# Patient Record
Sex: FEMALE | Race: WHITE | NOT HISPANIC OR LATINO | Employment: OTHER | URBAN - METROPOLITAN AREA
[De-identification: names, ages, dates, MRNs, and addresses within clinical notes are randomized per-mention and may not be internally consistent; named-entity substitution may affect disease eponyms.]

---

## 2017-01-03 ENCOUNTER — ALLSCRIPTS OFFICE VISIT (OUTPATIENT)
Dept: OTHER | Facility: OTHER | Age: 77
End: 2017-01-03

## 2017-01-03 DIAGNOSIS — R09.89 OTHER SPECIFIED SYMPTOMS AND SIGNS INVOLVING THE CIRCULATORY AND RESPIRATORY SYSTEMS: ICD-10-CM

## 2017-01-17 ENCOUNTER — HOSPITAL ENCOUNTER (OUTPATIENT)
Dept: NON INVASIVE DIAGNOSTICS | Facility: CLINIC | Age: 77
Discharge: HOME | End: 2017-01-17
Payer: COMMERCIAL

## 2017-01-17 DIAGNOSIS — R09.89 OTHER SPECIFIED SYMPTOMS AND SIGNS INVOLVING THE CIRCULATORY AND RESPIRATORY SYSTEMS: ICD-10-CM

## 2017-01-17 PROCEDURE — 93880 EXTRACRANIAL BILAT STUDY: CPT

## 2017-01-20 ENCOUNTER — GENERIC CONVERSION - ENCOUNTER (OUTPATIENT)
Dept: OTHER | Facility: OTHER | Age: 77
End: 2017-01-20

## 2017-01-22 ENCOUNTER — GENERIC CONVERSION - ENCOUNTER (OUTPATIENT)
Dept: OTHER | Facility: OTHER | Age: 77
End: 2017-01-22

## 2017-03-02 ENCOUNTER — ALLSCRIPTS OFFICE VISIT (OUTPATIENT)
Dept: OTHER | Facility: OTHER | Age: 77
End: 2017-03-02

## 2017-03-02 DIAGNOSIS — M25.531 PAIN IN RIGHT WRIST: ICD-10-CM

## 2017-04-03 ENCOUNTER — ALLSCRIPTS OFFICE VISIT (OUTPATIENT)
Dept: OTHER | Facility: OTHER | Age: 77
End: 2017-04-03

## 2017-04-03 DIAGNOSIS — Z13.820 ENCOUNTER FOR SCREENING FOR OSTEOPOROSIS: ICD-10-CM

## 2017-04-03 DIAGNOSIS — Z12.31 ENCOUNTER FOR SCREENING MAMMOGRAM FOR MALIGNANT NEOPLASM OF BREAST: ICD-10-CM

## 2017-04-03 DIAGNOSIS — M25.561 PAIN IN RIGHT KNEE: ICD-10-CM

## 2017-04-06 ENCOUNTER — GENERIC CONVERSION - ENCOUNTER (OUTPATIENT)
Dept: OTHER | Facility: OTHER | Age: 77
End: 2017-04-06

## 2017-04-13 ENCOUNTER — GENERIC CONVERSION - ENCOUNTER (OUTPATIENT)
Dept: OTHER | Facility: OTHER | Age: 77
End: 2017-04-13

## 2017-05-30 ENCOUNTER — GENERIC CONVERSION - ENCOUNTER (OUTPATIENT)
Dept: OTHER | Facility: OTHER | Age: 77
End: 2017-05-30

## 2017-06-13 ENCOUNTER — GENERIC CONVERSION - ENCOUNTER (OUTPATIENT)
Dept: OTHER | Facility: OTHER | Age: 77
End: 2017-06-13

## 2017-06-13 LAB
25(OH)D3 SERPL-MCNC: 49 NG/ML (ref 30–100)
A/G RATIO (HISTORICAL): 1.4 (CALC) (ref 1–2.5)
ALBUMIN SERPL BCP-MCNC: 3.8 G/DL (ref 3.6–5.1)
ALP SERPL-CCNC: 108 U/L (ref 33–130)
ALT SERPL W P-5'-P-CCNC: 21 U/L (ref 6–29)
AST SERPL W P-5'-P-CCNC: 17 U/L (ref 10–35)
BILIRUB SERPL-MCNC: 0.4 MG/DL (ref 0.2–1.2)
BUN SERPL-MCNC: 19 MG/DL (ref 7–25)
BUN/CREA RATIO (HISTORICAL): 33 (CALC) (ref 6–22)
CALCIUM (ADJUSTED FOR ALBUMIN) (HISTORICAL): 10.8 MG/DL (CALC) (ref 8.6–10.2)
CALCIUM SERPL-MCNC: 10.4 MG/DL (ref 8.6–10.4)
CHLORIDE SERPL-SCNC: 107 MMOL/L (ref 98–110)
CHOLEST SERPL-MCNC: 205 MG/DL (ref 125–200)
CHOLEST/HDLC SERPL: 3.3 (CALC)
CO2 SERPL-SCNC: 29 MMOL/L (ref 20–31)
CREAT SERPL-MCNC: 0.58 MG/DL (ref 0.6–0.93)
DEPRECATED RDW RBC AUTO: 15.4 % (ref 11–15)
EGFR AFRICAN AMERICAN (HISTORICAL): 105 ML/MIN/1.73M2
EGFR-AMERICAN CALC (HISTORICAL): 91 ML/MIN/1.73M2
GAMMA GLOBULIN (HISTORICAL): 2.8 G/DL (CALC) (ref 1.9–3.7)
GLUCOSE (HISTORICAL): 83 MG/DL (ref 65–99)
HCT VFR BLD AUTO: 42.6 % (ref 35–45)
HDLC SERPL-MCNC: 62 MG/DL
HGB BLD-MCNC: 13.8 G/DL (ref 11.7–15.5)
LDL CHOLESTEROL (HISTORICAL): 101 MG/DL (CALC)
MCH RBC QN AUTO: 29.5 PG (ref 27–33)
MCHC RBC AUTO-ENTMCNC: 32.3 G/DL (ref 32–36)
MCV RBC AUTO: 91.6 FL (ref 80–100)
NON-HDL-CHOL (CHOL-HDL) (HISTORICAL): 143 MG/DL (CALC)
PLATELET # BLD AUTO: 270 THOUSAND/UL (ref 140–400)
PMV BLD AUTO: 8.8 FL (ref 7.5–12.5)
POTASSIUM SERPL-SCNC: 4.2 MMOL/L (ref 3.5–5.3)
RBC # BLD AUTO: 4.66 MILLION/UL (ref 3.8–5.1)
SODIUM SERPL-SCNC: 144 MMOL/L (ref 135–146)
TOTAL PROTEIN (HISTORICAL): 6.6 G/DL (ref 6.1–8.1)
TRIGL SERPL-MCNC: 209 MG/DL
TSH SERPL DL<=0.05 MIU/L-ACNC: 2.87 MIU/L (ref 0.4–4.5)
WBC # BLD AUTO: 5.2 THOUSAND/UL (ref 3.8–10.8)

## 2017-08-17 ENCOUNTER — GENERIC CONVERSION - ENCOUNTER (OUTPATIENT)
Dept: OTHER | Facility: OTHER | Age: 77
End: 2017-08-17

## 2017-08-29 ENCOUNTER — GENERIC CONVERSION - ENCOUNTER (OUTPATIENT)
Dept: OTHER | Facility: OTHER | Age: 77
End: 2017-08-29

## 2017-09-05 ENCOUNTER — GENERIC CONVERSION - ENCOUNTER (OUTPATIENT)
Dept: OTHER | Facility: OTHER | Age: 77
End: 2017-09-05

## 2017-10-02 ENCOUNTER — GENERIC CONVERSION - ENCOUNTER (OUTPATIENT)
Dept: OTHER | Facility: OTHER | Age: 77
End: 2017-10-02

## 2017-10-27 ENCOUNTER — GENERIC CONVERSION - ENCOUNTER (OUTPATIENT)
Dept: OTHER | Facility: OTHER | Age: 77
End: 2017-10-27

## 2017-11-28 ENCOUNTER — ALLSCRIPTS OFFICE VISIT (OUTPATIENT)
Dept: OTHER | Facility: OTHER | Age: 77
End: 2017-11-28

## 2017-11-29 NOTE — PROGRESS NOTES
Assessment    1  Chronic pain of right knee (857 89,626 21) (M25 561,G89 29)    Plan  Chronic pain of right knee    · Kenalog 40 MG/ML Injection Suspension (Triamcinolone Acetonide); INJECT1  ML Intra-articular; To Be Done: 47JWQ1432   · 3 - Fazal Khan MD, [facility]  (Orthopedic Surgery) Co-Management  *  Status: Hold For -Scheduling  Requested for: 44NKF1717  : Pt refused all other referrals  are Referring to a non- Preferred Provider : Patient refused suggestion for    recommended provider  Care Summary provided  : Yes  Flu vaccine need    · Fluzone High-Dose 0 5 ML Intramuscular Suspension Prefilled Syringe    Discussion/Summary    Patient is a 51-year-old female withright knee pain - appears persistent  Unclear as to the exact etiology of her symptoms  Request x-ray imaging from [facility]  Reviewed her previous treatment for this problem  It appears that her symptoms may likely be secondary to moderate to severe osteoarthritis  Her corticosteroid steroid injection in the past has been effective for her  This was repeated today  She may likely need physical therapy as well as MRI imaging  She is requesting a referral to Orthopedics  This was given to her today at her request  Follow up if her symptoms are persisting  Chief Complaint  pt here f/u on right knee pain  pt states that it hurt when walking   Patient is here today for follow up of chronic conditions described in HPI  History of Present Illness  Patient is a 51-year-old female presents today with CC of persistent right knee pain  She has been having pain for the past 6 months  She believes it is related to her previous injury  She has been to see her orthopedic specialist  She has had the injection is  Recommendations were made for her to have a synthetic injection  She did have imaging including x-ray imaging  She would like referral today for Orthopedics        Review of Systems   Constitutional: not feeling poorly-- and-- not feeling tired  Eyes: no eyesight problems-- and-- no purulent discharge from the eyes  ENT: no nosebleeds-- and-- no nasal discharge  Cardiovascular: no chest pain-- and-- no palpitations  Respiratory: no cough-- and-- no shortness of breath during exertion  Gastrointestinal: no nausea-- and-- no diarrhea  Genitourinary: no pelvic pain-- and-- no dysmenorrhea  Musculoskeletal: arthralgias, but-- no myalgias  Active Problems    1  Balance disorder (781 99) (R26 89)   2  Benign essential hypertension (401 1) (I10)   3  Cataract of both eyes (366 9) (H26 9)   4  Chronic lumbar pain (724 2,338 29) (M54 5,G89 29)   5  Chronic pain of right knee (609 43,853 83) (M25 561,G89 29)   6  Degeneration of posterior vitreous body, right (379 21) (H43 811)   7  Degenerative arthritis of lumbar spine (721 3) (M47 816)   8  Depression screen (V79 0) (Z13 89)   9  Edema (782 3) (R60 9)   10  Encounter for screening mammogram for breast cancer (V76 12) (Z12 31)   11  Exudative age-related macular degeneration of left eye (362 52) (H35 3220)   12  Generalized osteoarthritis of multiple sites (715 09) (M15 9)   13  Hypercalcemia (275 42) (E83 52)   14  Hyperlipidemia (272 4) (E78 5)   15  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)  (H35 3112)   16  Macular degeneration (362 50) (H35 30)   17  History of Need for pneumococcal vaccination (V03 82) (Z23)   18  Right carotid bruit (785 9) (R09 89)   19  Right wrist pain (719 43) (M25 531)   20  Screening for osteoporosis (V82 81) (Z13 820)   21  Vitamin D deficiency (268 9) (E55 9)    Past Medical History    1  History of Accelerated essential hypertension (401 0) (I10)   2  Acute bronchitis (466 0) (J20 9)   3  History of Encounter for screening colonoscopy (V76 51) (Z12 11)   4  History of Encounter for screening mammogram for malignant neoplasm of breast (V76 12) (Z12 31)   5  History of fatigue (V13 89) (Z87 898)   6   History of low back pain (V13 59) (Z87 39)   7  History of Left Ankle Joint Pain (719 47)   8  History of Muscle cramping (729 82) (R25 2)   9  History of Need for influenza vaccination (V04 81) (Z23)   10  History of Need for pneumococcal vaccination (V03 82) (Z23)   11  History of Need for pneumococcal vaccination (V03 82) (Z23)   12  History of Screening for colon cancer (V76 51) (Z12 11)   13  History of Screening for colon cancer (V76 51) (Z12 11)   14  History of Screening for diabetes mellitus (DM) (V77 1) (Z13 1)   15  History of Screening for thyroid disorder (V77 0) (Z13 29)   16  History of Shoulder joint pain, unspecified laterality   17  History of Shoulder pain, left (719 41) (M25 512)   18  History of Shoulder pain, right (719 41) (M25 511)    The active problems and past medical history were reviewed and updated today  Surgical History  1  History of Eye Surgery   2  History of Foot Surgery    The surgical history was reviewed and updated today  Family History  Father    1  Family history of Coronary artery disease   2  Family history of arthritis (V17 7) (Z82 61)   3  Family history of diabetes mellitus (V18 0) (Z83 3)   4  Family history of malignant neoplasm (V16 9) (Z80 9)  Grandmother    5  Family history of arthritis (V17 7) (Z82 61)   6  Family history of diabetes mellitus (V18 0) (Z83 3)    The family history was reviewed and updated today  Social History     · Former smoker (F37 34) (F02 971)   · Lives with spouse   ·    · Occasional alcohol use   · Two children  The social history was reviewed and updated today  The social history was reviewed and is unchanged  Current Meds   1  Ezetimibe-Simvastatin 10-20 MG Oral Tablet; take 1 tablet by mouth once daily; Therapy: 16JKQ6742 to (Evaluate:30Bsd1823)  Requested for: 86UXX4801; Last Rx:22Rtq0804 Ordered   2  HydroCHLOROthiazide 12 5 MG Oral Capsule; take 1 capsule by mouth once daily;  Therapy: 32Uqj6039 to ([id number]) Requested for: 18CSL9368; Last CS:70MWJ1228 Ordered   3  Kenalog 40 MG/ML Injection Suspension; Inject 1ml intraarticular; To Be Done: 08IDO2482; Status: HOLD FOR - Administration Ordered   4  Losartan Potassium 50 MG Oral Tablet; take 1 tablet by mouth twice a day; Therapy: 59AJZ7297 to (Evaluate:35Vtc3207)  Requested for: 17Pmr6213; Last Rx:44Ijt1691 Ordered   5  MethylPREDNISolone Acetate 80 MG/ML Injection Suspension; INJECT 1  ML Intra-articular; To Be Done: 15XYT6561; Status: HOLD FOR - Administration Ordered   6  Metoprolol Succinate ER 25 MG Oral Tablet Extended Release 24 Hour; take 1 tablet by mouth once daily; Therapy: 24UEN6041 to (Evaluate:86Naf0012)  Requested for: 22YZL3134; Last Rx:45Wyj5136 Ordered   7  Vitamin D-3 5000 UNIT Oral Tablet; TAKE 2 CAPSULE Daily; Therapy: 14TNQ7875 to (Last Rx:62Bza4317) Ordered    The medication list was reviewed and updated today  Allergies  1  Sulfa Drugs    Vitals  Vital Signs    Recorded: 09TMZ7803 08:15AM   Temperature 95 7 F, Tympanic   Heart Rate 69   Pulse Quality Normal   Respiration Quality Normal   Respiration 18   Systolic 296, LUE, Sitting   Diastolic 94, LUE, Sitting   Height 5 ft 4 in   Weight 201 lb 3 oz   BMI Calculated 34 53   BSA Calculated 1 96   O2 Saturation 95   LMP menopause   Pain Scale 5       Physical Exam   Constitutional  General appearance: No acute distress, well appearing and well nourished  Eyes  Conjunctiva and lids: No swelling, erythema or discharge  Pupils and irises: Equal, round and reactive to light  Ears, Nose, Mouth, and Throat  External inspection of ears and nose: Normal    Pulmonary  Respiratory effort: No increased work of breathing or signs of respiratory distress  Musculoskeletal  Gait and station: Abnormal   Gait evaluation demonstrated antalgia on the right  Right Knee: Appearance: no visible abnormalities  Palpation: no pain upon palpation   Special Test: a negative anterior drawer sign,-- a negative Lachman test,-- negative for increased valgus stress,-- negative for increased varus stress,-- a negative lateral Dwayne meniscal integrity test-- and-- a negative medial Dwayne meniscal integrity test       Health Management  History of Encounter for screening mammogram for malignant neoplasm of breast   Digital Bilateral Screening Mammogram With CAD; every 1 year; Last 15ORZ9031; Next XRX:95QWW8040; Overdue  History of Screening for colon cancer   COLONOSCOPY; every 10 years; Next Due: 98Eyc9620; Overdue  Purveyour Maintenance   Medicare Annual Wellness Visit; every 1 year; Next Due: 22Pjd3075; Overdue    Future Appointments    Date/Time Provider Specialty Site   01/09/2018 09:20 AM Chalmers Heimlich, M D  Cardiology  [facility]       Signatures   Electronically signed by :  Clary Silvestre DO; Nov 28 2017  9:06AM EST                       (Author)

## 2018-01-09 ENCOUNTER — ALLSCRIPTS OFFICE VISIT (OUTPATIENT)
Dept: OTHER | Facility: OTHER | Age: 78
End: 2018-01-09

## 2018-01-09 NOTE — RESULT NOTES
Verified Results  (Q) CBC (H/H, RBC, INDICES, WBC, PLT) 46LRO7559 11:30AM Kaila Rennoviaab     Test Name Result Flag Reference   WHITE BLOOD CELL COUNT 5 2 Thousand/uL  3 8-10 8   RED BLOOD CELL COUNT 4 66 Million/uL  3 80-5 10   HEMOGLOBIN 13 8 g/dL  11 7-15 5   HEMATOCRIT 42 6 %  35 0-45 0   MCV 91 6 fL  80 0-100 0   MCH 29 5 pg  27 0-33 0   MCHC 32 3 g/dL  32 0-36 0   RDW 15 4 % H 11 0-15 0   PLATELET COUNT 395 Thousand/uL  140-400   MPV 8 8 fL  7 5-12 5     (Q) COMPREHENSIVE METABOLIC PNL W/ADJUSTED CALCIUM 12Jun2017 11:30AM Kaila DATAllegro     Test Name Result Flag Reference   GLUCOSE 83 mg/dL  65-99   Fasting reference interval   UREA NITROGEN (BUN) 19 mg/dL  7-25   CREATININE 0 58 mg/dL L 0 60-0 93   For patients >52years of age, the reference limit  for Creatinine is approximately 13% higher for people  identified as -American  eGFR NON-AFR  AMERICAN 91 mL/min/1 73m2  > OR = 60   eGFR AFRICAN AMERICAN 105 mL/min/1 73m2  > OR = 60   BUN/CREATININE RATIO 33 (calc) H 6-22   SODIUM 144 mmol/L  135-146   POTASSIUM 4 2 mmol/L  3 5-5 3   CHLORIDE 107 mmol/L     CARBON DIOXIDE 29 mmol/L  20-31   CALCIUM 10 4 mg/dL  8 6-10 4   CALCIUM (ADJUSTED FOR$ALBUMIN) 10 8 mg/dL (calc) H 8 6-10 2   PROTEIN, TOTAL 6 6 g/dL  6 1-8 1   ALBUMIN 3 8 g/dL  3 6-5 1   GLOBULIN 2 8 g/dL (calc)  1 9-3 7   ALBUMIN/GLOBULIN RATIO 1 4 (calc)  1 0-2 5   BILIRUBIN, TOTAL 0 4 mg/dL  0 2-1 2   ALKALINE PHOSPHATASE 108 U/L     AST 17 U/L  10-35   ALT 21 U/L  6-29     (Q) LIPID PANEL WITH REFLEX TO DIRECT LDL 59YKF8640 11:30AM Kaila DATAllegro     Test Name Result Flag Reference   CHOLESTEROL, TOTAL 205 mg/dL H 125-200   HDL CHOLESTEROL 62 mg/dL  > OR = 46   TRIGLICERIDES 287 mg/dL H <150   LDL-CHOLESTEROL 101 mg/dL (calc)  <130   Desirable range <100 mg/dL for patients with CHD or  diabetes and <70 mg/dL for diabetic patients with  known heart disease     CHOL/HDLC RATIO 3 3 (calc)  < OR = 5 0   NON HDL CHOLESTEROL 143 mg/dL (calc)     Target for non-HDL cholesterol is 30 mg/dL higher than   LDL cholesterol target  (Q) TSH, 3RD GENERATION W/REFLEX TO FT4 76SBQ1213 11:30AM Ewa Bright   REPORT COMMENT:  FASTING:YES     Test Name Result Flag Reference   TSH W/REFLEX TO FT4 2 87 mIU/L  0 40-4 50     *(Q) VITAMIN D, 25-HYDROXY, LC/MS/MS 12Jun2017 11:30AM Ewa Bright     Test Name Result Flag Reference   VITAMIN D, 25-OH, TOTAL 49 ng/mL     Vitamin D Status         25-OH Vitamin D:     Deficiency:                    <20 ng/mL  Insufficiency:             20 - 29 ng/mL  Optimal:                 > or = 30 ng/mL     For 25-OH Vitamin D testing on patients on   D2-supplementation and patients for whom quantitation   of D2 and D3 fractions is required, the QuestAssureD(TM)  25-OH VIT D, (D2,D3), LC/MS/MS is recommended: order   code 06989 (patients >2yrs)  For more information on this test, go to:  http://education  [employer]/faq/QIK537  (This link is being provided for   informational/educational purposes only )

## 2018-01-10 NOTE — PROGRESS NOTES
Assessment   Assessed    1  Benign essential hypertension (401 1) (I10)   2  Edema (782 3) (R60 9)   3  Hyperlipidemia (272 4) (E78 5)    Plan   Benign essential hypertension    · EKG/ECG- POC; Status:Complete;   Done: 50PJY3362 09:12AM   Perform: In Office; Last Updated By:Shelli Yanez; 1/9/2018 9:13:21 AM;Ordered; For:Benign essential hypertension; Ordered By:Mir Magdaleno;  Benign essential hypertension, Edema    · Follow-up visit in 1 year Evaluation and Treatment  Follow-up  Status: Hold For -    Scheduling  Requested for: 25VHP4508   Ordered; For: Benign essential hypertension, Edema; Ordered By: Jacque Morrow Performed:  Due: 72FVP0836    Discussion/Summary   Cardiology Discussion Summary Free Text Note Form [facility]: It is my impression that the patient appears to be fairly well compensated with the diagnosis of benign essential hypertension  Her blood pressure was a bit elevated today  We will continue losartan 50 mg BID with HCTZ 12 5 mg daily  She also will continue metoprolol  She was told to monitor her BP at home  I might consider changing her to valsartan since it is more effective than losartan  We also have some room to move up on her beta blocker  I would avoid amlodipine in light of this worsening her edema  Her edema is not that bad  She has had favorable lipids in June and will remain on simvastatin/ezetimibe  She had a negative stress test in 2015 and I believe her LEVI is non cardiac  Her palpitations do not seem pathologic  She does have a questionable right carotid bruit but have did not have obstructive disease on US last year  I will see her again in 12 months time  Chief Complaint   Chief Complaint Free Text Note Form: patient here for 12 month FU of edema and HTN  Yonathan Petit also has HLP    Chief Complaint Chronic Condition [facility]: Patient is here today for follow up of chronic conditions described in HPI        History of Present Illness   Cardiology HPI Free Text Note Form [facility]: Since the patients last visit she ongoing edema  Her edema comes and goes    She denies chest pain  She does have ongoing LEVI  She denies lightheadedness  She does get some palpitations which she describes as her heart beating in her ears  She feels it may be a little rapid      Review of Systems   Cardiology Female ROS:         Cardiac: has swelling in the -- and-- palpitations present , but-- no chest pain-- and-- no rhythm problems  Skin: No complaints of nonhealing sores or skin rash  Genitourinary: frequent urination at night-- and-- post menopausal, but-- no recurrent urinary tract infections,-- no difficult urination,-- no blood in urine,-- no kidney stones,-- no loss of bladder control,-- no kidney problems,-- no birth control or hormone replacement therapy-- and-- not pregnant      Psychological: no depression-- and-- no anxiety  General: lack of energy/fatigue, but-- no trouble sleeping  Respiratory: shortness of breath, but-- no cough/sputum,-- no wheezing,-- no phlegm-- and-- no hemoptysis  Gastrointestinal: heartburn, but-- no nausea,-- no vomiting,-- no bloody stools,-- no diarrhea,-- no constipation-- and-- no rectal bleeding      Neurological: no weakness,-- no headaches-- and-- no dizziness      Musculoskeletal: arthritis-- and-- swelling/pain, but-- no back pain-- right knee injury  ROS Reviewed:    ROS reviewed  Active Problems   Problems    1  Balance disorder (781 99) (R26 89)   2  Benign essential hypertension (401 1) (I10)   3  Cataract of both eyes (366 9) (H26 9)   4  Chronic lumbar pain (724 2,338 29) (M54 5,G89 29)   5  Chronic pain of right knee (358 24,876 32) (M25 561,G89 29)   6  Degeneration of posterior vitreous body, right (379 21) (H43 811)   7  Degenerative arthritis of lumbar spine (721 3) (M47 816)   8  Depression screen (V79 0) (Z13 89)   9  Edema (782 3) (R60 9)   10  Encounter for screening mammogram for breast cancer (V76 12) (Z12 31)   11   Exudative age-related macular degeneration of left eye (362 52) (H35 3220)   12  Flu vaccine need (V04 81) (Z23)   13  Generalized osteoarthritis of multiple sites (715 09) (M15 9)   14  Hypercalcemia (275 42) (E83 52)   15  Hyperlipidemia (272 4) (E78 5)   16  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)      (H35 3112)   17  Macular degeneration (362 50) (H35 30)   18  History of Need for pneumococcal vaccination (V03 82) (Z23)   19  Right carotid bruit (785 9) (R09 89)   20  Right wrist pain (719 43) (M25 531)   21  Screening for osteoporosis (V82 81) (Z13 820)   22  Vitamin D deficiency (268 9) (E55 9)    Past Medical History   Problems    1  History of Accelerated essential hypertension (401 0) (I10)   2  Acute bronchitis (466 0) (J20 9)   3  History of Encounter for screening colonoscopy (V76 51) (Z12 11)   4  History of Encounter for screening mammogram for malignant neoplasm of breast     (V76 12) (Z12 31)   5  History of fatigue (V13 89) (Z87 898)   6  History of low back pain (V13 59) (Z87 39)   7  History of Left Ankle Joint Pain (719 47)   8  History of Muscle cramping (729 82) (R25 2)   9  History of Need for influenza vaccination (V04 81) (Z23)   10  History of Need for pneumococcal vaccination (V03 82) (Z23)   11  History of Need for pneumococcal vaccination (V03 82) (Z23)   12  History of Screening for colon cancer (V76 51) (Z12 11)   13  History of Screening for colon cancer (V76 51) (Z12 11)   14  History of Screening for diabetes mellitus (DM) (V77 1) (Z13 1)   15  History of Screening for thyroid disorder (V77 0) (Z13 29)   16  History of Shoulder joint pain, unspecified laterality   17  History of Shoulder pain, left (719 41) (M25 512)   18  History of Shoulder pain, right (719 41) (M25 511)  Active Problems And Past Medical History Reviewed: The active problems and past medical history were reviewed and updated today  Surgical History   Problems    1   History of Eye Surgery   2  History of Foot Surgery  Surgical History Reviewed: The surgical history was reviewed and updated today  Family History   Father    1  Family history of Coronary artery disease   2  Family history of arthritis (V17 7) (Z82 61)   3  Family history of diabetes mellitus (V18 0) (Z83 3)   4  Family history of malignant neoplasm (V16 9) (Z80 9)  Grandmother    5  Family history of arthritis (V17 7) (Z82 61)   6  Family history of diabetes mellitus (V18 0) (Z83 3)  Family History Reviewed: The family history was reviewed and updated today  Social History   Problems    · Former smoker (L71 71) (Z92 557)   · Lives with spouse   ·    · Occasional alcohol use   · Two children  Social History Reviewed: The social history was reviewed and updated today  The social history was reviewed and is unchanged  Current Meds    1  Ezetimibe-Simvastatin 10-20 MG Oral Tablet; take 1 tablet by mouth once daily; Therapy: 72QBY4156 to (Evaluate:50Uya0259)  Requested for: 83HZY6013; Last     Rx:08Vbz0358 Ordered   2  HydroCHLOROthiazide 12 5 MG Oral Capsule; take 1 capsule by mouth once daily; Therapy: 18IYC9334 to (64 873 135)  Requested for: 23GSV8633; Last     Rx:60Wfk9327 Ordered   3  Kenalog 40 MG/ML Injection Suspension; Inject 1ml intraarticular; To Be Done:     64AKI3982; Status: HOLD FOR - Administration Ordered   4  Losartan Potassium 50 MG Oral Tablet; take 1 tablet by mouth twice a day; Therapy: 25KNG3350 to (Evaluate:08Pma6033)  Requested for: 08Cvr1632; Last     Rx:91Tsa4550 Ordered   5  MethylPREDNISolone Acetate 80 MG/ML Injection Suspension; INJECT 1  ML     Intra-articular; To Be Done: 02VHD2074; Status: HOLD FOR - Administration Ordered   6  Metoprolol Succinate ER 25 MG Oral Tablet Extended Release 24 Hour; take 1 tablet by     mouth once daily; Therapy: 26ZAT1652 to 616 496 185)  Requested for: 89Jmf7091; Last     Rx:98Ccc6955 Ordered   7  PreserVision AREDS 2+Multi Vit Oral Capsule; TAKE AS DIRECTED; Therapy: 45NKP7930 to Recorded   8  Vitamin D-3 5000 UNIT Oral Tablet; TAKE 2 CAPSULE Daily; Therapy: 88DOP0842 to (Last Rx:16Hgo7969) Ordered  Medication List Reviewed: The medication list was reviewed and updated today  Allergies   Medication    1  Sulfa Drugs    Vitals   Vital Signs    Recorded: 03VMW7539 09:21AM   Heart Rate 73   Respiration 16   Systolic 259, RUE, Sitting   Diastolic 84, RUE, Sitting   BP CUFF SIZE Large   Height 5 ft 4 in   Weight 201 lb    BMI Calculated 34 5   BSA Calculated 1 96     Physical Exam        Constitutional      General appearance: Abnormal  -- obese elderly white female in NAD  Eyes      Conjunctiva and Sclera examination: Conjunctiva pink, sclera anicteric  Ears, Nose, Mouth, and Throat - Oropharynx: Clear, nares are clear, mucous membranes are moist       Neck      Neck and thyroid: Normal, supple, trachea midline, no thyromegaly  Pulmonary      Auscultation of lungs: Clear to auscultation, no rales, no rhonchi, no wheezing, good air movement  Cardiovascular      Auscultation of heart: Normal rate and rhythm, normal S1 and S2, no murmurs  Carotid pulses: Abnormal  -- questionable bruit of right carotid-nl upstroke    no obstructive disease on CA US 2017  Peripheral vascular exam: Normal pulses throughout, no tenderness, erythema or swelling  Pedal pulses: Normal, 2+ bilaterally  Examination of extremities for edema and/or varicosities: Abnormal  -- 1+ edema of the LEs  Abdomen      Abdomen: Non-tender and no distention  Liver and spleen: No hepatomegaly or splenomegaly  Health Management   History of Encounter for screening mammogram for malignant neoplasm of breast   Digital Bilateral Screening Mammogram With CAD; every 1 year; Last 08ZYB5632; Next Due:    25XJK9458;  Overdue  History of Screening for colon cancer   COLONOSCOPY; every 10 years; Next Due: 86Auh9807; Overdue  Health Maintenance   Medicare Annual Wellness Visit; every 1 year; Next Due: 91Vxr6034;  Overdue    Signatures    Electronically signed by : MICHELL Dow ; Jan 9 2018 10:03AM EST                       (Author)

## 2018-01-11 NOTE — RESULT NOTES
Verified Results  VAS CAROTID COMPLETE STUDY 42WLC1159 07:52AM Maria Esther Vilchis Order Number: [id number]    - Patient Instructions: To schedule this appointment, please contact Central Scheduling at [phone]  Test Name Result Flag Reference   VAS CAROTID COMPLETE STUDY (Report)     [facility] REPORT   CLINICAL:   Indications: Bruit [R09 89]  Patient is asymptomatic  Risk Factors   The patient has history of obesity, HTN and hyperlipidemia  Clinical   Right Pressure: 160/84 mm Hg,      FINDINGS:      Right    Impression PSV EDV (cm/s) Direction of Flow Ratio    Dist  ICA         81     21           0 77    Mid  ICA         75     15           0 72    Prox  ICA  1 - 49%   98     23           0 94    Dist CCA         90     15                 Mid CCA         104     20           1 17    Prox CCA         89     19                 Ext Carotid       130     10           1 24    Prox Vert         39     11 Antegrade            Subclavian        349      6                    Left     Impression PSV EDV (cm/s) Ratio    Dist  ICA         95     29  1 01    Mid  ICA         102     29  1 08    Prox  ICA  1 - 49%   74     17  0 79    Dist CCA         94     18        Mid CCA          94     18  1 15    Prox CCA         82     18        Ext Carotid       137      9  1 45    Prox Vert         65     15        Subclavian        164      9                 CONCLUSION:   Impression   RIGHT:   There is <50% stenosis noted in the internal carotid artery  Plaque is   heterogenous  Vertebral artery flow is antegrade  There is no significant subclavian artery   disease  LEFT:   There is <50% stenosis noted in the internal carotid artery  Plaque is   heterogenous  Vertebral artery flow is antegrade  There is no significant subclavian artery   disease        Internal carotid artery stenosis determination by consensus criteria from:   Poonam Sandoval , et al  Carotid Artery Stenosis: Gray-Scale and Doppler US Diagnosis   - Society of [employer]iologists in [address] [place] [address], Radiology 2003;   [phone]        SIGNATURE:   Electronically Signed by: Jimy Coelho MD on 2017-01-17 11:10:43 AM

## 2018-01-12 VITALS
RESPIRATION RATE: 16 BRPM | SYSTOLIC BLOOD PRESSURE: 140 MMHG | WEIGHT: 200.38 LBS | HEART RATE: 63 BPM | DIASTOLIC BLOOD PRESSURE: 58 MMHG | BODY MASS INDEX: 34.21 KG/M2 | HEIGHT: 64 IN

## 2018-01-12 VITALS
DIASTOLIC BLOOD PRESSURE: 94 MMHG | TEMPERATURE: 95.7 F | RESPIRATION RATE: 18 BRPM | HEART RATE: 69 BPM | SYSTOLIC BLOOD PRESSURE: 140 MMHG | BODY MASS INDEX: 34.35 KG/M2 | HEIGHT: 64 IN | WEIGHT: 201.19 LBS | OXYGEN SATURATION: 95 %

## 2018-01-13 NOTE — RESULT NOTES
Verified Results  (1) CBC/PLT/DIFF 11Aug2016 10:18AM Seguricel     Test Name Result Flag Reference   WHITE BLOOD CELL COUNT 6 4 Thousand/uL  3 8-10 8   RED BLOOD CELL COUNT 4 76 Million/uL  3 80-5 10   HEMOGLOBIN 13 5 g/dL  11 7-15 5   HEMATOCRIT 42 7 %  35 0-45 0   MCV 89 6 fL  80 0-100 0   MCH 28 4 pg  27 0-33 0   MCHC 31 7 g/dL L 32 0-36 0   RDW 14 3 %  11 0-15 0   PLATELET COUNT 826 Thousand/uL  140-400   MPV 9 1 fL  7 5-11 5   ABSOLUTE NEUTROPHILS 3309 cells/uL  1876-3739   ABSOLUTE LYMPHOCYTES 2234 cells/uL  850-3900   ABSOLUTE MONOCYTES 634 cells/uL  200-950   ABSOLUTE EOSINOPHILS 186 cells/uL     ABSOLUTE BASOPHILS 38 cells/uL  0-200   NEUTROPHILS 51 7 %     LYMPHOCYTES 34 9 %     MONOCYTES 9 9 %     EOSINOPHILS 2 9 %     BASOPHILS 0 6 %       (1) COMPREHENSIVE METABOLIC PANEL [id number] 20:30PZ Seguricel     Test Name Result Flag Reference   GLUCOSE 98 mg/dL  65-99   Fasting reference interval   UREA NITROGEN (BUN) 20 mg/dL  7-25   CREATININE 0 55 mg/dL L 0 60-0 93   For patients >52years of age, the reference limit  for Creatinine is approximately 13% higher for people  identified as -American  eGFR NON-AFR   AMERICAN 92 mL/min/1 73m2  > OR = 60   eGFR AFRICAN AMERICAN 107 mL/min/1 73m2  > OR = 60   BUN/CREATININE RATIO 36 (calc) H 6-22   SODIUM 141 mmol/L  135-146   POTASSIUM 4 8 mmol/L  3 5-5 3   CHLORIDE 104 mmol/L     CARBON DIOXIDE 29 mmol/L  20-31   CALCIUM 10 5 mg/dL H 8 6-10 4   PROTEIN, TOTAL 6 8 g/dL  6 1-8 1   ALBUMIN 3 8 g/dL  3 6-5 1   GLOBULIN 3 0 g/dL (calc)  1 9-3 7   ALBUMIN/GLOBULIN RATIO 1 3 (calc)  1 0-2 5   BILIRUBIN, TOTAL 0 5 mg/dL  0 2-1 2   ALKALINE PHOSPHATASE 115 U/L     AST 18 U/L  10-35   ALT 18 U/L  6-29     (Q) LIPID PANEL WITH REFLEX TO DIRECT LDL [id number] 10:18AM Seguricel     Test Name Result Flag Reference   CHOLESTEROL, TOTAL 252 mg/dL H 125-200   HDL CHOLESTEROL 59 mg/dL  > OR = 46   TRIGLICERIDES 127 mg/dL H <150 LDL-CHOLESTEROL 143 mg/dL (calc) H <130   Desirable range <100 mg/dL for patients with CHD or  diabetes and <70 mg/dL for diabetic patients with  known heart disease  CHOL/HDLC RATIO 4 3 (calc)  < OR = 5 0   NON HDL CHOLESTEROL 193 mg/dL (calc) H    Target for non-HDL cholesterol is 30 mg/dL higher than   LDL cholesterol target  *(Q) VITAMIN D, 25-HYDROXY, LC/MS/MS 11Aug2016 10:18AM Izaiah Andre     Test Name Result Flag Reference   VITAMIN D, 25-OH, TOTAL 48 ng/mL     Vitamin D Status         25-OH Vitamin D:     Deficiency:                    <20 ng/mL  Insufficiency:             20 - 29 ng/mL  Optimal:                 > or = 30 ng/mL     For 25-OH Vitamin D testing on patients on   D2-supplementation and patients for whom quantitation   of D2 and D3 fractions is required, the QuestAssureD(TM)  25-OH VIT D, (D2,D3), LC/MS/MS is recommended: order   code 89763 (patients >2yrs)  For more information on this test, go to:  [email or web address]  (This link is being provided for   informational/educational purposes only )     (Q) TSH, 3RD GENERATION W/REFLEX TO FT4 11Aug2016 10:18AM Izaiah Andre   REPORT COMMENT:  FASTING:YES     Test Name Result Flag Reference   TSH W/REFLEX TO FT4 3 88 mIU/L  0 40-4 50

## 2018-01-14 VITALS
HEART RATE: 61 BPM | BODY MASS INDEX: 34.7 KG/M2 | TEMPERATURE: 97 F | RESPIRATION RATE: 16 BRPM | WEIGHT: 203.25 LBS | OXYGEN SATURATION: 98 % | SYSTOLIC BLOOD PRESSURE: 138 MMHG | HEIGHT: 64 IN | DIASTOLIC BLOOD PRESSURE: 86 MMHG

## 2018-01-14 VITALS
WEIGHT: 203.56 LBS | TEMPERATURE: 96.5 F | HEIGHT: 64 IN | BODY MASS INDEX: 34.75 KG/M2 | RESPIRATION RATE: 16 BRPM | SYSTOLIC BLOOD PRESSURE: 170 MMHG | DIASTOLIC BLOOD PRESSURE: 100 MMHG | HEART RATE: 68 BPM | OXYGEN SATURATION: 98 %

## 2018-01-23 VITALS
BODY MASS INDEX: 34.31 KG/M2 | RESPIRATION RATE: 16 BRPM | SYSTOLIC BLOOD PRESSURE: 150 MMHG | HEART RATE: 73 BPM | DIASTOLIC BLOOD PRESSURE: 84 MMHG | HEIGHT: 64 IN | WEIGHT: 201 LBS

## 2018-02-22 ENCOUNTER — OFFICE VISIT (OUTPATIENT)
Dept: FAMILY MEDICINE CLINIC | Facility: CLINIC | Age: 78
End: 2018-02-22
Payer: COMMERCIAL

## 2018-02-22 VITALS
SYSTOLIC BLOOD PRESSURE: 138 MMHG | OXYGEN SATURATION: 97 % | HEART RATE: 70 BPM | BODY MASS INDEX: 35.12 KG/M2 | HEIGHT: 65 IN | TEMPERATURE: 98.3 F | WEIGHT: 210.8 LBS | RESPIRATION RATE: 16 BRPM | DIASTOLIC BLOOD PRESSURE: 70 MMHG

## 2018-02-22 DIAGNOSIS — I10 BENIGN ESSENTIAL HYPERTENSION: Primary | ICD-10-CM

## 2018-02-22 DIAGNOSIS — R60.9 EDEMA, UNSPECIFIED TYPE: ICD-10-CM

## 2018-02-22 DIAGNOSIS — E78.2 MIXED HYPERLIPIDEMIA: ICD-10-CM

## 2018-02-22 PROBLEM — G89.29 CHRONIC PAIN OF RIGHT KNEE: Status: ACTIVE | Noted: 2017-04-03

## 2018-02-22 PROBLEM — M25.561 CHRONIC PAIN OF RIGHT KNEE: Status: ACTIVE | Noted: 2017-04-03

## 2018-02-22 PROCEDURE — 3078F DIAST BP <80 MM HG: CPT | Performed by: FAMILY MEDICINE

## 2018-02-22 PROCEDURE — 99214 OFFICE O/P EST MOD 30 MIN: CPT | Performed by: FAMILY MEDICINE

## 2018-02-22 PROCEDURE — 3075F SYST BP GE 130 - 139MM HG: CPT | Performed by: FAMILY MEDICINE

## 2018-02-22 RX ORDER — EZETIMIBE AND SIMVASTATIN 10; 20 MG/1; MG/1
1 TABLET ORAL DAILY
COMMUNITY
Start: 2013-07-19 | End: 2018-03-01 | Stop reason: SDUPTHER

## 2018-02-22 RX ORDER — LOSARTAN POTASSIUM 50 MG/1
1 TABLET ORAL 2 TIMES DAILY
COMMUNITY
Start: 2015-06-01 | End: 2018-10-26 | Stop reason: SDUPTHER

## 2018-02-22 RX ORDER — FUROSEMIDE 20 MG/1
20 TABLET ORAL DAILY
Qty: 30 TABLET | Refills: 1 | Status: SHIPPED | OUTPATIENT
Start: 2018-02-22 | End: 2018-04-30 | Stop reason: SDUPTHER

## 2018-02-22 RX ORDER — METOPROLOL SUCCINATE 25 MG/1
1 TABLET, EXTENDED RELEASE ORAL DAILY
COMMUNITY
Start: 2015-03-13 | End: 2018-04-30 | Stop reason: SDUPTHER

## 2018-02-22 RX ORDER — MAG HYDROX/ALUMINUM HYD/SIMETH 400-400-40
1 SUSPENSION, ORAL (FINAL DOSE FORM) ORAL DAILY
COMMUNITY
Start: 2014-09-25

## 2018-02-22 RX ORDER — HYDROCHLOROTHIAZIDE 12.5 MG/1
1 CAPSULE, GELATIN COATED ORAL DAILY
COMMUNITY
Start: 2015-04-27 | End: 2018-04-03 | Stop reason: SDUPTHER

## 2018-02-22 NOTE — ASSESSMENT & PLAN NOTE
Still w/ LE edema despite hctz 12 5 bid  Will add furosemide 20 mg qd  Check electrolytes/renal function in 2-3 weeks (rx given for FBW )  Will call  Poss need to increase furosemide dose

## 2018-02-22 NOTE — PROGRESS NOTES
Assessment/Plan:    Benign essential hypertension  Reasonably controlled on losartan 50, metoprolol 25, hctz 12 5 bid  Edema  Still w/ LE edema despite hctz 12 5 bid  Will add furosemide 20 mg qd  Check electrolytes/renal function in 2-3 weeks (rx given for FBW )  Will call  Poss need to increase furosemide dose  Hyperlipidemia  Was switched to generic vytorin 10/20  Will check labs in near future  Will call  Diagnoses and all orders for this visit:    Benign essential hypertension  -     TSH, 3rd generation with T4 reflex; Future    Edema, unspecified type  -     CBC and differential; Future  -     furosemide (LASIX) 20 mg tablet; Take 1 tablet (20 mg total) by mouth daily    Mixed hyperlipidemia  -     Comprehensive metabolic panel; Future  -     Lipid Panel with Direct LDL reflex; Future    Other orders  -     hydrochlorothiazide (MICROZIDE) 12 5 mg capsule; Take 1 capsule by mouth daily  -     metoprolol succinate (TOPROL-XL) 25 mg 24 hr tablet; Take 1 tablet by mouth daily  -     losartan (COZAAR) 50 mg tablet; Take 1 tablet by mouth 2 (two) times a day  -     Cholecalciferol (VITAMIN D3) 5000 units CAPS; Take 2 capsules by mouth daily  -     Multiple Vitamins-Minerals (PRESERVISION AREDS 2+MULTI VIT PO); Take by mouth  -     ezetimibe-simvastatin (VYTORIN) 10-20 mg per tablet; Take 1 tablet by mouth daily      rx given for FBW in 3 weeks  Will call w/ results  Subjective:      Patient ID: Shelba Canavan is a 78 y o  female  HPI    The following portions of the patient's history were reviewed and updated as appropriate: allergies, current medications, past family history, past medical history, past social history, past surgical history and problem list     Review of Systems   Respiratory: Negative  Cardiovascular: Negative  Gastrointestinal: Negative  Genitourinary: Negative            Objective:      /70 (BP Location: Right arm, Patient Position: Sitting, Cuff Size: Standard) Pulse 70   Temp 98 3 °F (36 8 °C) (Tympanic)   Resp 16   Ht 5' 4 96" (1 65 m)   Wt 95 6 kg (210 lb 12 8 oz)   SpO2 97%   BMI 35 12 kg/m²          Physical Exam   Cardiovascular: Normal rate, regular rhythm, normal heart sounds and intact distal pulses  Carotids: no bruits  Ext: no edema   Pulmonary/Chest: Effort normal  No respiratory distress  She has no wheezes  She has no rales  Psychiatric: She has a normal mood and affect   Her behavior is normal  Thought content normal

## 2018-03-01 DIAGNOSIS — E78.5 HYPERLIPIDEMIA, UNSPECIFIED HYPERLIPIDEMIA TYPE: Primary | ICD-10-CM

## 2018-03-02 RX ORDER — EZETIMIBE AND SIMVASTATIN 10; 20 MG/1; MG/1
1 TABLET ORAL DAILY
Qty: 90 TABLET | Refills: 0 | OUTPATIENT
Start: 2018-03-02 | End: 2018-07-11 | Stop reason: SDUPTHER

## 2018-03-12 NOTE — TELEPHONE ENCOUNTER
I called the [facility] to verify if pt picked up rx  I spoke to the pharmacist pt picked up her rx on 03/11/18

## 2018-04-03 DIAGNOSIS — I10 BENIGN ESSENTIAL HYPERTENSION: Primary | ICD-10-CM

## 2018-04-04 RX ORDER — HYDROCHLOROTHIAZIDE 12.5 MG/1
CAPSULE, GELATIN COATED ORAL
Qty: 90 CAPSULE | Refills: 1 | Status: SHIPPED | OUTPATIENT
Start: 2018-04-04 | End: 2018-07-31 | Stop reason: SDUPTHER

## 2018-04-30 DIAGNOSIS — I10 BENIGN ESSENTIAL HYPERTENSION: Primary | ICD-10-CM

## 2018-04-30 DIAGNOSIS — R60.9 EDEMA, UNSPECIFIED TYPE: ICD-10-CM

## 2018-04-30 LAB
ALP SERPL-CCNC: 115 U/L (ref 46–116)
ALT SERPL W P-5'-P-CCNC: 27 U/L (ref 12–78)
AST SERPL W P-5'-P-CCNC: 18 U/L (ref 5–45)
BILIRUB SERPL-MCNC: 0.4 MG/DL
BUN SERPL-MCNC: 19 MG/DL (ref 5–25)
CHOLEST SERPL-MCNC: 176 MG/DL (ref 50–200)
CREAT SERPL-MCNC: 0.65 MG/DL (ref 0.6–1.3)
GLUCOSE SERPL-MCNC: 94 MG/DL
HCT VFR BLD AUTO: 40.9 % (ref 34.8–46.1)
HDLC SERPL-MCNC: 58 MG/DL (ref 40–60)
HGB BLD-MCNC: 13.4 G/DL (ref 11.5–15.4)
LDLC SERPL DIRECT ASSAY-MCNC: 71 MG/DL
LDLC/HDLC SERPL: 3.03 {RATIO}
NEUTROPHILS # BLD AUTO: 3.8 THOUSANDS/ΜL (ref 1.85–7.62)
PLATELET # BLD AUTO: 279 THOUSANDS/UL (ref 149–390)
POTASSIUM SERPL-SCNC: 4.5 MMOL/L (ref 3.5–5.3)
SODIUM SERPL-SCNC: 144 MMOL/L (ref 136–145)
TRIGL SERPL-MCNC: 237 MG/DL (ref ?–150)
TSH SERPL DL<=0.05 MIU/L-ACNC: 5.4 UIU/ML (ref 0.34–4.82)
WBC # BLD AUTO: 7.8 10*3/ML (ref 3.3–10)

## 2018-04-30 RX ORDER — FUROSEMIDE 20 MG/1
TABLET ORAL
Qty: 30 TABLET | Refills: 1 | Status: SHIPPED | OUTPATIENT
Start: 2018-04-30 | End: 2018-07-31 | Stop reason: SDUPTHER

## 2018-05-01 RX ORDER — METOPROLOL SUCCINATE 25 MG/1
TABLET, EXTENDED RELEASE ORAL
Qty: 90 TABLET | Refills: 0 | Status: SHIPPED | OUTPATIENT
Start: 2018-05-01 | End: 2018-09-10 | Stop reason: SDUPTHER

## 2018-05-03 LAB
CALCIUM SERPL-MCNC: 10.3 MG/DL (ref 8.3–10.1)
CO2 SERPL-SCNC: 34 MMOL/L (ref 21–32)
T4 FREE SERPL-MCNC: 0.9 NG/DL (ref 0.76–1.46)

## 2018-05-15 ENCOUNTER — TELEPHONE (OUTPATIENT)
Dept: FAMILY MEDICINE CLINIC | Facility: CLINIC | Age: 78
End: 2018-05-15

## 2018-05-16 NOTE — TELEPHONE ENCOUNTER
Pt notified, she states she still has swelling in her legs and will increase furosemide  She will call back to schedule in one week if need be

## 2018-05-16 NOTE — TELEPHONE ENCOUNTER
Call patient with lab results  Her thyroid was borderline low normal   Remainder of labs look pretty good  How is  This swelling in her legs? If not improving, then recommend increasing furosemide to 2 tablets daily   Recheck appointment in 1 week if not improved

## 2018-07-11 DIAGNOSIS — E78.5 HYPERLIPIDEMIA, UNSPECIFIED HYPERLIPIDEMIA TYPE: ICD-10-CM

## 2018-07-12 RX ORDER — EZETIMIBE AND SIMVASTATIN 10; 20 MG/1; MG/1
TABLET ORAL
Qty: 90 TABLET | Refills: 1 | Status: SHIPPED | OUTPATIENT
Start: 2018-07-12 | End: 2019-03-15 | Stop reason: SDUPTHER

## 2018-07-31 ENCOUNTER — OFFICE VISIT (OUTPATIENT)
Dept: FAMILY MEDICINE CLINIC | Facility: CLINIC | Age: 78
End: 2018-07-31
Payer: COMMERCIAL

## 2018-07-31 VITALS
BODY MASS INDEX: 34.88 KG/M2 | RESPIRATION RATE: 17 BRPM | WEIGHT: 204.3 LBS | SYSTOLIC BLOOD PRESSURE: 154 MMHG | TEMPERATURE: 96.4 F | HEART RATE: 74 BPM | DIASTOLIC BLOOD PRESSURE: 84 MMHG | OXYGEN SATURATION: 91 % | HEIGHT: 64 IN

## 2018-07-31 DIAGNOSIS — M17.11 ARTHRITIS OF RIGHT KNEE: ICD-10-CM

## 2018-07-31 DIAGNOSIS — E78.2 MIXED HYPERLIPIDEMIA: ICD-10-CM

## 2018-07-31 DIAGNOSIS — R60.9 EDEMA, UNSPECIFIED TYPE: ICD-10-CM

## 2018-07-31 DIAGNOSIS — I10 BENIGN ESSENTIAL HYPERTENSION: ICD-10-CM

## 2018-07-31 DIAGNOSIS — Z01.818 PREOP EXAMINATION: Primary | ICD-10-CM

## 2018-07-31 PROBLEM — G89.29 CHRONIC PAIN OF RIGHT KNEE: Status: RESOLVED | Noted: 2017-04-03 | Resolved: 2018-07-31

## 2018-07-31 PROBLEM — M25.561 CHRONIC PAIN OF RIGHT KNEE: Status: RESOLVED | Noted: 2017-04-03 | Resolved: 2018-07-31

## 2018-07-31 PROCEDURE — 3725F SCREEN DEPRESSION PERFORMED: CPT | Performed by: FAMILY MEDICINE

## 2018-07-31 PROCEDURE — 1160F RVW MEDS BY RX/DR IN RCRD: CPT | Performed by: FAMILY MEDICINE

## 2018-07-31 PROCEDURE — 4040F PNEUMOC VAC/ADMIN/RCVD: CPT | Performed by: FAMILY MEDICINE

## 2018-07-31 PROCEDURE — 99214 OFFICE O/P EST MOD 30 MIN: CPT | Performed by: FAMILY MEDICINE

## 2018-07-31 PROCEDURE — 1036F TOBACCO NON-USER: CPT | Performed by: FAMILY MEDICINE

## 2018-07-31 RX ORDER — FUROSEMIDE 20 MG/1
20 TABLET ORAL DAILY
Qty: 90 TABLET | Refills: 1 | Status: SHIPPED | OUTPATIENT
Start: 2018-07-31 | End: 2019-07-13 | Stop reason: SDUPTHER

## 2018-07-31 RX ORDER — HYDROCHLOROTHIAZIDE 12.5 MG/1
12.5 CAPSULE, GELATIN COATED ORAL DAILY
Qty: 90 CAPSULE | Refills: 1 | Status: SHIPPED | OUTPATIENT
Start: 2018-07-31 | End: 2019-04-17 | Stop reason: SDUPTHER

## 2018-07-31 NOTE — PROGRESS NOTES
Assessment/Plan:    Preop examination  This is preoperative clearance for upcoming right total knee arthroplasty on August 30th by Dr Jessica Grullon  Otherwise patient is feeling well  She is doing well on all currently  Prescribed medications without side effects       Her examination today is unremarkable  She is scheduled for PA T labs and EKG  Tomorrow  I asked her to forward me results  She is already set received cardiac clearance from Dr Christianson Corporal  pending PA T results   patient is medically  Cleared for upcoming surgery       Diagnoses and all orders for this visit:    Preop examination    Arthritis of right knee    Benign essential hypertension  -     hydrochlorothiazide (MICROZIDE) 12 5 mg capsule; Take 1 capsule (12 5 mg total) by mouth daily    Edema, unspecified type  -     furosemide (LASIX) 20 mg tablet; Take 1 tablet (20 mg total) by mouth daily    Mixed hyperlipidemia          Subjective:      Patient ID: Rosa Marin is a 70 y o  female  This is preoperative clearance for upcoming right total knee arthroplasty on August 30th by Dr Jessica Grullon  Otherwise patient is feeling well  She is doing well on all currently  Prescribed medications without side effects           The following portions of the patient's history were reviewed and updated as appropriate: allergies, current medications, past family history, past medical history, past social history, past surgical history and problem list     Review of Systems   Respiratory: Negative  Cardiovascular: Negative  Gastrointestinal: Negative  Genitourinary: Negative  Musculoskeletal: Positive for arthralgias  Objective:      /84   Pulse 74   Temp (!) 96 4 °F (35 8 °C) (Tympanic)   Resp 17   Ht 5' 4" (1 626 m)   Wt 92 7 kg (204 lb 4 8 oz)   SpO2 91%   BMI 35 07 kg/m²          Physical Exam   Cardiovascular: Normal rate, regular rhythm, normal heart sounds and intact distal pulses      Carotids: no bruits  Ext: no edema   Pulmonary/Chest: Effort normal  No respiratory distress  She has no wheezes  She has no rales  Psychiatric: She has a normal mood and affect   Her behavior is normal  Thought content normal

## 2018-07-31 NOTE — ASSESSMENT & PLAN NOTE
This is preoperative clearance for upcoming right total knee arthroplasty on August 30th by Dr Eden Cooks  Otherwise patient is feeling well  She is doing well on all currently  Prescribed medications without side effects       Her examination today is unremarkable  She is scheduled for PA T labs and EKG  Tomorrow  I asked her to forward me results  She is already set received cardiac clearance from Dr Butler Shown       pending PA T results   patient is medically  Cleared for upcoming surgery

## 2018-08-25 ENCOUNTER — TELEPHONE (OUTPATIENT)
Dept: FAMILY MEDICINE CLINIC | Facility: CLINIC | Age: 78
End: 2018-08-25

## 2018-08-25 NOTE — TELEPHONE ENCOUNTER
Pt called the office she is scheduled for surgery on 8/30/2018 and is requesting her pre op clearance  Forms please be completed and or faxed she received a message from her surgeon that if they do not receive it they will post pone her surgery  I did read that pt was cleared for surgery and pt was informed that the form is waiting for Dr Aggarwal to address once he is in the office which will be on Monday 8/27/2018  Dr Aggarwal please fax asap  Pt would appreciate a phone call once pre op forms are faxed we may leave message on the home phone

## 2018-08-26 ENCOUNTER — TELEPHONE (OUTPATIENT)
Dept: FAMILY MEDICINE CLINIC | Facility: CLINIC | Age: 78
End: 2018-08-26

## 2018-08-26 NOTE — TELEPHONE ENCOUNTER
Calling [facility] to request pt's blwk that she had done on 8/01/2018  Spoke to Donald Harris she will be sending us pt's blwk results to our back line fax

## 2018-08-26 NOTE — TELEPHONE ENCOUNTER
Pt's pre admisssion testing blwk from [facility] in on your desk in your folder attached to pt's office note and form  I called and left a detailed message for pt on her cell phone info,ing her I spoke with  Jose Alejandro Cadena and he is aware of her prop forms and we received pt's blood work so Jose Alejandro Cadena can review and clear her for surgery once we get an answer we will fax her paper work and call pt  I just wanted pt informed Jose Alejandro Cadena is aware and we did receive her blwk

## 2018-08-26 NOTE — TELEPHONE ENCOUNTER
Also calling [facility] requesting the actual ekg for Dr Aggarwal to clear pt  Left a message with the answering service to request a copy of pt's ekg be faxed asap to our office

## 2018-09-10 DIAGNOSIS — I10 BENIGN ESSENTIAL HYPERTENSION: ICD-10-CM

## 2018-09-10 RX ORDER — METOPROLOL SUCCINATE 25 MG/1
25 TABLET, EXTENDED RELEASE ORAL DAILY
Qty: 90 TABLET | Refills: 1 | Status: SHIPPED | OUTPATIENT
Start: 2018-09-10 | End: 2019-08-12 | Stop reason: SDUPTHER

## 2018-10-26 DIAGNOSIS — I10 ESSENTIAL HYPERTENSION: Primary | ICD-10-CM

## 2018-10-28 RX ORDER — LOSARTAN POTASSIUM 50 MG/1
TABLET ORAL
Qty: 180 TABLET | Refills: 3 | Status: SHIPPED | OUTPATIENT
Start: 2018-10-28 | End: 2020-03-04 | Stop reason: SDUPTHER

## 2019-02-21 ENCOUNTER — OFFICE VISIT (OUTPATIENT)
Dept: FAMILY MEDICINE CLINIC | Facility: CLINIC | Age: 79
End: 2019-02-21
Payer: COMMERCIAL

## 2019-02-21 VITALS
RESPIRATION RATE: 17 BRPM | HEART RATE: 73 BPM | BODY MASS INDEX: 33.34 KG/M2 | SYSTOLIC BLOOD PRESSURE: 146 MMHG | TEMPERATURE: 98.6 F | OXYGEN SATURATION: 93 % | HEIGHT: 64 IN | DIASTOLIC BLOOD PRESSURE: 86 MMHG | WEIGHT: 195.3 LBS

## 2019-02-21 DIAGNOSIS — E78.2 MIXED HYPERLIPIDEMIA: ICD-10-CM

## 2019-02-21 DIAGNOSIS — R60.9 EDEMA, UNSPECIFIED TYPE: ICD-10-CM

## 2019-02-21 DIAGNOSIS — M17.11 ARTHRITIS OF RIGHT KNEE: ICD-10-CM

## 2019-02-21 DIAGNOSIS — J01.00 ACUTE NON-RECURRENT MAXILLARY SINUSITIS: Primary | ICD-10-CM

## 2019-02-21 DIAGNOSIS — I10 BENIGN ESSENTIAL HYPERTENSION: ICD-10-CM

## 2019-02-21 PROCEDURE — 1036F TOBACCO NON-USER: CPT | Performed by: FAMILY MEDICINE

## 2019-02-21 PROCEDURE — 1160F RVW MEDS BY RX/DR IN RCRD: CPT | Performed by: FAMILY MEDICINE

## 2019-02-21 PROCEDURE — 99214 OFFICE O/P EST MOD 30 MIN: CPT | Performed by: FAMILY MEDICINE

## 2019-02-21 RX ORDER — AMOXICILLIN 875 MG/1
875 TABLET, COATED ORAL 2 TIMES DAILY
Qty: 20 TABLET | Refills: 0 | Status: SHIPPED | OUTPATIENT
Start: 2019-02-21 | End: 2019-03-03

## 2019-02-21 NOTE — ASSESSMENT & PLAN NOTE
Status post right total knee arthroplasty by Dr Israel Montelongo 8/2018    Overall she is doing well

## 2019-02-21 NOTE — ASSESSMENT & PLAN NOTE
Much improved since starting furosemide 20 mg daily p r n  Jose Luis Jose   Patient takes a few tablets per week

## 2019-02-21 NOTE — PROGRESS NOTES
[facility]      NAME: Ubaldo Wagner  AGE: 70 y o  SEX: female  : 1940   MRN: [id number]    DATE: 2019  TIME: 10:55 AM    Assessment and Plan     Problem List Items Addressed This Visit     Benign essential hypertension     Suboptimal   Patient has not been taking blood pressure medications while she is sick  I stressed compliance with losartan 50, metoprolol 25, hydrochlorothiazide 12 5  Will recheck at her upcoming Medicare wellness visit         Relevant Orders    CBC and differential    TSH, 3rd generation with Free T4 reflex    Edema     Much improved since starting furosemide 20 mg daily p r n  Gerhardt Sep Patient takes a few tablets per week         Relevant Orders    Comprehensive metabolic panel    Hyperlipidemia     Doing well on Vytorin 10/20  Will check fasting blood work in near future followed by appointment         Relevant Orders    Comprehensive metabolic panel    Lipid Panel with Direct LDL reflex    Arthritis of right knee     Status post right total knee arthroplasty by Dr Marin Whittington 2018  Overall she is doing well           Other Visit Diagnoses     Acute non-recurrent maxillary sinusitis    -  Primary    Rx given for amoxicillin 875 b i d  Times 10 days  Drink plenty of fluids  Call further problems    Relevant Medications    amoxicillin (AMOXIL) 875 mg tablet              Return to office in:  Fasting blood work in near future at [facility] [employer] followed by [address] [place] [address] Complaint   Patient presents with    Cough     x1wk    Sore Throat     x1wk    Chest Congestion     x1wk       History of Present Illness     Sore throat and cough x 5 days  ?fevers  Due to symptoms, she has not taken her BP meds lately  Pt is a non smoker  Pt did not have a flu shot this season  Doing well on Vytorin for cholesterol          The following portions of the patient's history were reviewed and updated as appropriate: allergies, current medications, past family history, past medical history, past social history, past surgical history and problem list     Review of Systems   Review of Systems   Constitutional: Negative for chills and fever  HENT: Positive for congestion and postnasal drip  Respiratory: Positive for cough  Negative for shortness of breath  Cardiovascular: Negative  Active Problem List     Patient Active Problem List   Diagnosis    Benign essential hypertension    Edema    Hyperlipidemia    Preop examination    Arthritis of right knee       Objective   /86   Pulse 73   Temp 98 6 °F (37 °C) (Tympanic)   Resp 17   Ht 5' 4" (1 626 m)   Wt 88 6 kg (195 lb 4 8 oz)   SpO2 93%   BMI 33 52 kg/m²     Physical Exam   Constitutional: She appears well-developed and well-nourished  HENT:   Turbinates inflamed   Neck: Normal range of motion  Neck supple  Pulmonary/Chest: Effort normal and breath sounds normal        Pertinent Laboratory/Diagnostic Studies:  None    Current Medications     Current Outpatient Medications:     Cholecalciferol (VITAMIN D3) 5000 units CAPS, Take 2 capsules by mouth daily, Disp: , Rfl:     ezetimibe-simvastatin (VYTORIN) 10-20 mg per tablet, TAKE 1 TABLET DAILY  , Disp: 90 tablet, Rfl: 1    furosemide (LASIX) 20 mg tablet, Take 1 tablet (20 mg total) by mouth daily, Disp: 90 tablet, Rfl: 1    hydrochlorothiazide (MICROZIDE) 12 5 mg capsule, Take 1 capsule (12 5 mg total) by mouth daily, Disp: 90 capsule, Rfl: 1    losartan (COZAAR) 50 mg tablet, take 1 tablet by mouth twice a day, Disp: 180 tablet, Rfl: 3    metoprolol succinate (TOPROL-XL) 25 mg 24 hr tablet, Take 1 tablet (25 mg total) by mouth daily, Disp: 90 tablet, Rfl: 1    Multiple Vitamins-Minerals (PRESERVISION AREDS 2+MULTI VIT PO), Take by mouth, Disp: , Rfl:     Multiple Vitamins-Minerals (PRESERVISION AREDS 2+MULTI VIT PO), Take 1 capsule by mouth daily, Disp: , Rfl:     amoxicillin (AMOXIL) 875 mg tablet, Take 1 tablet (875 mg total) by mouth 2 (two) times a day for 10 days, Disp: 20 tablet, Rfl: 0    Health Maintenance     Health Maintenance   Topic Date Due    Medicare Annual Wellness Visit (AWV)  1940    SLP PLAN OF CARE  1940    CRC Screening: Colonoscopy  1940    BMI: Followup Plan  06/28/1960    DTaP,Tdap,and Td Vaccines (1 - Tdap) 06/28/1963    Fall Risk  06/28/2007    Urinary Incontinence Screening  06/28/2007    INFLUENZA VACCINE  07/01/2018    Depression Screening PHQ  07/31/2019    BMI: Adult  07/31/2019    Pneumococcal PPSV23/PCV13 65+ Years / Low and Medium Risk  Completed    HEPATITIS B VACCINES  Aged Out     Immunization History   Administered Date(s) Administered    INFLUENZA 11/28/2017    Influenza Split High Dose Preservative Free IM 11/28/2017    Pneumococcal Conjugate 13-Valent 08/04/2015    Pneumococcal Polysaccharide PPV23 07/19/2013       Dandre Simmons DO  [facility]3] [facility]

## 2019-02-21 NOTE — ASSESSMENT & PLAN NOTE
Suboptimal   Patient has not been taking blood pressure medications while she is sick  I stressed compliance with losartan 50, metoprolol 25, hydrochlorothiazide 12 5    Will recheck at her upcoming Medicare wellness visit

## 2019-03-15 DIAGNOSIS — E78.5 HYPERLIPIDEMIA, UNSPECIFIED HYPERLIPIDEMIA TYPE: ICD-10-CM

## 2019-03-16 RX ORDER — EZETIMIBE AND SIMVASTATIN 10; 20 MG/1; MG/1
1 TABLET ORAL DAILY
Qty: 90 TABLET | Refills: 1 | Status: SHIPPED | OUTPATIENT
Start: 2019-03-16 | End: 2019-12-11 | Stop reason: SDUPTHER

## 2019-04-17 DIAGNOSIS — I10 BENIGN ESSENTIAL HYPERTENSION: ICD-10-CM

## 2019-04-17 RX ORDER — HYDROCHLOROTHIAZIDE 12.5 MG/1
CAPSULE, GELATIN COATED ORAL
Qty: 90 CAPSULE | Refills: 1 | Status: SHIPPED | OUTPATIENT
Start: 2019-04-17 | End: 2019-12-11 | Stop reason: SDUPTHER

## 2019-07-13 DIAGNOSIS — R60.9 EDEMA, UNSPECIFIED TYPE: ICD-10-CM

## 2019-07-14 RX ORDER — FUROSEMIDE 20 MG/1
20 TABLET ORAL DAILY
Qty: 90 TABLET | Refills: 1 | Status: SHIPPED | OUTPATIENT
Start: 2019-07-14 | End: 2020-10-01 | Stop reason: SDUPTHER

## 2019-08-12 DIAGNOSIS — I10 BENIGN ESSENTIAL HYPERTENSION: ICD-10-CM

## 2019-08-13 RX ORDER — METOPROLOL SUCCINATE 25 MG/1
25 TABLET, EXTENDED RELEASE ORAL DAILY
Qty: 90 TABLET | Refills: 1 | Status: SHIPPED | OUTPATIENT
Start: 2019-08-13 | End: 2020-03-02

## 2019-12-11 DIAGNOSIS — E78.5 HYPERLIPIDEMIA, UNSPECIFIED HYPERLIPIDEMIA TYPE: ICD-10-CM

## 2019-12-11 DIAGNOSIS — I10 BENIGN ESSENTIAL HYPERTENSION: ICD-10-CM

## 2019-12-11 RX ORDER — EZETIMIBE AND SIMVASTATIN 10; 20 MG/1; MG/1
1 TABLET ORAL DAILY
Qty: 90 TABLET | Refills: 1 | Status: SHIPPED | OUTPATIENT
Start: 2019-12-11 | End: 2020-10-01 | Stop reason: SDUPTHER

## 2019-12-11 RX ORDER — HYDROCHLOROTHIAZIDE 12.5 MG/1
12.5 CAPSULE, GELATIN COATED ORAL DAILY
Qty: 90 CAPSULE | Refills: 1 | Status: SHIPPED | OUTPATIENT
Start: 2019-12-11 | End: 2020-08-13

## 2020-03-02 DIAGNOSIS — I10 BENIGN ESSENTIAL HYPERTENSION: ICD-10-CM

## 2020-03-02 RX ORDER — METOPROLOL SUCCINATE 25 MG/1
TABLET, EXTENDED RELEASE ORAL
Qty: 30 TABLET | Refills: 0 | Status: SHIPPED | OUTPATIENT
Start: 2020-03-02 | End: 2020-10-01 | Stop reason: SDUPTHER

## 2020-03-02 NOTE — TELEPHONE ENCOUNTER
Patient informed she needs an appt  She was leaving to go to a  and could not make it now  She will call back to schedule

## 2020-03-02 NOTE — TELEPHONE ENCOUNTER
Call patient, she is overdue for checkup and AWV    Will refill her metoprolol prescription for 30 days, but cannot issue any further refills without appointment

## 2020-03-04 DIAGNOSIS — I10 ESSENTIAL HYPERTENSION: ICD-10-CM

## 2020-03-04 RX ORDER — LOSARTAN POTASSIUM 50 MG/1
50 TABLET ORAL 2 TIMES DAILY
Qty: 60 TABLET | Refills: 0 | Status: SHIPPED | OUTPATIENT
Start: 2020-03-04 | End: 2020-04-13 | Stop reason: SDUPTHER

## 2020-03-04 NOTE — TELEPHONE ENCOUNTER
Call patient    I will refill her losartan prescription for 1 more month, but she needs an appointment before any further refills will be authorized

## 2020-04-06 ENCOUNTER — TELEPHONE (OUTPATIENT)
Dept: FAMILY MEDICINE CLINIC | Facility: CLINIC | Age: 80
End: 2020-04-06

## 2020-04-13 DIAGNOSIS — I10 ESSENTIAL HYPERTENSION: ICD-10-CM

## 2020-04-13 RX ORDER — LOSARTAN POTASSIUM 50 MG/1
50 TABLET ORAL 2 TIMES DAILY
Qty: 180 TABLET | Refills: 0 | Status: SHIPPED | OUTPATIENT
Start: 2020-04-13 | End: 2020-07-15

## 2020-06-09 ENCOUNTER — TELEPHONE (OUTPATIENT)
Dept: FAMILY MEDICINE CLINIC | Facility: CLINIC | Age: 80
End: 2020-06-09

## 2020-06-14 DIAGNOSIS — I10 BENIGN ESSENTIAL HYPERTENSION: ICD-10-CM

## 2020-06-14 RX ORDER — METOPROLOL SUCCINATE 25 MG/1
TABLET, EXTENDED RELEASE ORAL
Qty: 30 TABLET | Refills: 0 | OUTPATIENT
Start: 2020-06-14

## 2020-07-15 DIAGNOSIS — I10 ESSENTIAL HYPERTENSION: ICD-10-CM

## 2020-07-15 RX ORDER — LOSARTAN POTASSIUM 50 MG/1
TABLET ORAL
Qty: 180 TABLET | Refills: 0 | Status: SHIPPED | OUTPATIENT
Start: 2020-07-15 | End: 2020-10-01 | Stop reason: SDUPTHER

## 2020-08-13 DIAGNOSIS — I10 BENIGN ESSENTIAL HYPERTENSION: ICD-10-CM

## 2020-08-13 RX ORDER — HYDROCHLOROTHIAZIDE 12.5 MG/1
CAPSULE, GELATIN COATED ORAL
Qty: 90 CAPSULE | Refills: 1 | Status: SHIPPED | OUTPATIENT
Start: 2020-08-13 | End: 2020-10-01 | Stop reason: SDUPTHER

## 2020-09-03 DIAGNOSIS — R60.9 EDEMA, UNSPECIFIED TYPE: ICD-10-CM

## 2020-09-03 DIAGNOSIS — R73.09 ABNORMAL BLOOD SUGAR: ICD-10-CM

## 2020-09-03 DIAGNOSIS — I10 BENIGN ESSENTIAL HYPERTENSION: Primary | ICD-10-CM

## 2020-09-03 DIAGNOSIS — E78.2 MIXED HYPERLIPIDEMIA: ICD-10-CM

## 2020-09-22 DIAGNOSIS — E78.5 HYPERLIPIDEMIA, UNSPECIFIED HYPERLIPIDEMIA TYPE: ICD-10-CM

## 2020-09-22 RX ORDER — EZETIMIBE AND SIMVASTATIN 10; 20 MG/1; MG/1
1 TABLET ORAL DAILY
Qty: 90 TABLET | Refills: 1 | OUTPATIENT
Start: 2020-09-22

## 2020-10-01 ENCOUNTER — OFFICE VISIT (OUTPATIENT)
Dept: FAMILY MEDICINE CLINIC | Facility: CLINIC | Age: 80
End: 2020-10-01
Payer: COMMERCIAL

## 2020-10-01 VITALS
HEART RATE: 75 BPM | WEIGHT: 209 LBS | OXYGEN SATURATION: 96 % | HEIGHT: 64 IN | BODY MASS INDEX: 35.68 KG/M2 | TEMPERATURE: 97 F | SYSTOLIC BLOOD PRESSURE: 120 MMHG | DIASTOLIC BLOOD PRESSURE: 80 MMHG | RESPIRATION RATE: 16 BRPM

## 2020-10-01 DIAGNOSIS — I10 BENIGN ESSENTIAL HYPERTENSION: ICD-10-CM

## 2020-10-01 DIAGNOSIS — Z78.0 POSTMENOPAUSAL: ICD-10-CM

## 2020-10-01 DIAGNOSIS — M19.90 ARTHRITIS: ICD-10-CM

## 2020-10-01 DIAGNOSIS — M17.11 ARTHRITIS OF RIGHT KNEE: ICD-10-CM

## 2020-10-01 DIAGNOSIS — E78.5 HYPERLIPIDEMIA, UNSPECIFIED HYPERLIPIDEMIA TYPE: ICD-10-CM

## 2020-10-01 DIAGNOSIS — Z00.00 ENCOUNTER FOR ANNUAL WELLNESS EXAM IN MEDICARE PATIENT: Primary | ICD-10-CM

## 2020-10-01 DIAGNOSIS — E78.2 MIXED HYPERLIPIDEMIA: ICD-10-CM

## 2020-10-01 DIAGNOSIS — R60.9 EDEMA, UNSPECIFIED TYPE: ICD-10-CM

## 2020-10-01 DIAGNOSIS — Z12.31 ENCOUNTER FOR SCREENING MAMMOGRAM FOR BREAST CANCER: ICD-10-CM

## 2020-10-01 DIAGNOSIS — F41.9 ANXIETY: ICD-10-CM

## 2020-10-01 PROCEDURE — 1160F RVW MEDS BY RX/DR IN RCRD: CPT | Performed by: FAMILY MEDICINE

## 2020-10-01 PROCEDURE — 99214 OFFICE O/P EST MOD 30 MIN: CPT | Performed by: FAMILY MEDICINE

## 2020-10-01 PROCEDURE — 1036F TOBACCO NON-USER: CPT | Performed by: FAMILY MEDICINE

## 2020-10-01 PROCEDURE — 1125F AMNT PAIN NOTED PAIN PRSNT: CPT | Performed by: FAMILY MEDICINE

## 2020-10-01 PROCEDURE — 1170F FXNL STATUS ASSESSED: CPT | Performed by: FAMILY MEDICINE

## 2020-10-01 PROCEDURE — G0439 PPPS, SUBSEQ VISIT: HCPCS | Performed by: FAMILY MEDICINE

## 2020-10-01 PROCEDURE — 1101F PT FALLS ASSESS-DOCD LE1/YR: CPT | Performed by: FAMILY MEDICINE

## 2020-10-01 PROCEDURE — 3288F FALL RISK ASSESSMENT DOCD: CPT | Performed by: FAMILY MEDICINE

## 2020-10-01 PROCEDURE — 3725F SCREEN DEPRESSION PERFORMED: CPT | Performed by: FAMILY MEDICINE

## 2020-10-01 PROCEDURE — 3079F DIAST BP 80-89 MM HG: CPT | Performed by: FAMILY MEDICINE

## 2020-10-01 PROCEDURE — 3074F SYST BP LT 130 MM HG: CPT | Performed by: FAMILY MEDICINE

## 2020-10-01 RX ORDER — EZETIMIBE AND SIMVASTATIN 10; 20 MG/1; MG/1
1 TABLET ORAL DAILY
Qty: 90 TABLET | Refills: 1 | Status: SHIPPED | OUTPATIENT
Start: 2020-10-01 | End: 2021-03-24 | Stop reason: SDUPTHER

## 2020-10-01 RX ORDER — FUROSEMIDE 20 MG/1
20 TABLET ORAL DAILY
Qty: 90 TABLET | Refills: 1 | Status: SHIPPED | OUTPATIENT
Start: 2020-10-01 | End: 2021-03-24 | Stop reason: SDUPTHER

## 2020-10-01 RX ORDER — METOPROLOL SUCCINATE 25 MG/1
25 TABLET, EXTENDED RELEASE ORAL DAILY
Qty: 90 TABLET | Refills: 1 | Status: SHIPPED | OUTPATIENT
Start: 2020-10-01 | End: 2021-03-24 | Stop reason: SDUPTHER

## 2020-10-01 RX ORDER — HYDROCHLOROTHIAZIDE 12.5 MG/1
12.5 CAPSULE, GELATIN COATED ORAL DAILY
Qty: 90 CAPSULE | Refills: 1 | Status: SHIPPED | OUTPATIENT
Start: 2020-10-01 | End: 2021-09-23 | Stop reason: SDUPTHER

## 2020-10-01 RX ORDER — LOSARTAN POTASSIUM 50 MG/1
50 TABLET ORAL 2 TIMES DAILY
Qty: 180 TABLET | Refills: 1 | Status: SHIPPED | OUTPATIENT
Start: 2020-10-01 | End: 2021-06-17 | Stop reason: SDUPTHER

## 2020-11-06 ENCOUNTER — VBI (OUTPATIENT)
Dept: ADMINISTRATIVE | Facility: OTHER | Age: 80
End: 2020-11-06

## 2020-11-12 ENCOUNTER — TELEPHONE (OUTPATIENT)
Dept: FAMILY MEDICINE CLINIC | Facility: CLINIC | Age: 80
End: 2020-11-12

## 2021-03-24 DIAGNOSIS — R60.9 EDEMA, UNSPECIFIED TYPE: ICD-10-CM

## 2021-03-24 DIAGNOSIS — E78.5 HYPERLIPIDEMIA, UNSPECIFIED HYPERLIPIDEMIA TYPE: ICD-10-CM

## 2021-03-24 DIAGNOSIS — I10 BENIGN ESSENTIAL HYPERTENSION: ICD-10-CM

## 2021-03-24 RX ORDER — FUROSEMIDE 20 MG/1
20 TABLET ORAL DAILY
Qty: 90 TABLET | Refills: 1 | Status: SHIPPED | OUTPATIENT
Start: 2021-03-24 | End: 2021-09-23 | Stop reason: SDUPTHER

## 2021-03-24 RX ORDER — METOPROLOL SUCCINATE 25 MG/1
25 TABLET, EXTENDED RELEASE ORAL DAILY
Qty: 90 TABLET | Refills: 1 | Status: SHIPPED | OUTPATIENT
Start: 2021-03-24 | End: 2021-09-23 | Stop reason: SDUPTHER

## 2021-03-24 RX ORDER — EZETIMIBE AND SIMVASTATIN 10; 20 MG/1; MG/1
1 TABLET ORAL DAILY
Qty: 90 TABLET | Refills: 1 | Status: SHIPPED | OUTPATIENT
Start: 2021-03-24 | End: 2021-09-23 | Stop reason: SDUPTHER

## 2021-06-17 DIAGNOSIS — I10 BENIGN ESSENTIAL HYPERTENSION: ICD-10-CM

## 2021-06-17 RX ORDER — LOSARTAN POTASSIUM 50 MG/1
50 TABLET ORAL 2 TIMES DAILY
Qty: 180 TABLET | Refills: 0 | Status: SHIPPED | OUTPATIENT
Start: 2021-06-17 | End: 2021-09-23 | Stop reason: SDUPTHER

## 2021-06-17 NOTE — TELEPHONE ENCOUNTER
Call patient  I sent in prescription for her losartan for 90 days, but she is due for appointment    Please schedule

## 2021-09-23 DIAGNOSIS — R60.9 EDEMA, UNSPECIFIED TYPE: ICD-10-CM

## 2021-09-23 DIAGNOSIS — I10 BENIGN ESSENTIAL HYPERTENSION: ICD-10-CM

## 2021-09-23 DIAGNOSIS — E78.5 HYPERLIPIDEMIA, UNSPECIFIED HYPERLIPIDEMIA TYPE: ICD-10-CM

## 2021-09-23 RX ORDER — LOSARTAN POTASSIUM 50 MG/1
50 TABLET ORAL 2 TIMES DAILY
Qty: 180 TABLET | Refills: 0 | Status: SHIPPED | OUTPATIENT
Start: 2021-09-23

## 2021-09-23 RX ORDER — EZETIMIBE AND SIMVASTATIN 10; 20 MG/1; MG/1
1 TABLET ORAL DAILY
Qty: 90 TABLET | Refills: 1 | Status: SHIPPED | OUTPATIENT
Start: 2021-09-23

## 2021-09-23 RX ORDER — HYDROCHLOROTHIAZIDE 12.5 MG/1
12.5 CAPSULE, GELATIN COATED ORAL DAILY
Qty: 90 CAPSULE | Refills: 1 | Status: SHIPPED | OUTPATIENT
Start: 2021-09-23

## 2021-09-23 RX ORDER — METOPROLOL SUCCINATE 25 MG/1
25 TABLET, EXTENDED RELEASE ORAL DAILY
Qty: 90 TABLET | Refills: 1 | Status: SHIPPED | OUTPATIENT
Start: 2021-09-23

## 2021-09-23 RX ORDER — FUROSEMIDE 20 MG/1
20 TABLET ORAL DAILY
Qty: 90 TABLET | Refills: 1 | Status: SHIPPED | OUTPATIENT
Start: 2021-09-23

## 2022-01-20 ENCOUNTER — RA CDI HCC (OUTPATIENT)
Dept: OTHER | Facility: HOSPITAL | Age: 82
End: 2022-01-20

## 2022-01-20 NOTE — PROGRESS NOTES
[facility]  coding opportunities       Chart reviewed, no opportunity found: CHART REVIEWED, NO OPPORTUNITY FOUND                        Patients insurance company: [employer] (Medicare Advantage and Commercial)

## 2022-01-24 PROBLEM — Z01.818 PREOP EXAMINATION: Status: RESOLVED | Noted: 2018-07-31 | Resolved: 2022-01-24

## 2022-01-27 ENCOUNTER — OFFICE VISIT (OUTPATIENT)
Dept: FAMILY MEDICINE CLINIC | Facility: CLINIC | Age: 82
End: 2022-01-27
Payer: COMMERCIAL

## 2022-01-27 VITALS
RESPIRATION RATE: 18 BRPM | OXYGEN SATURATION: 96 % | HEIGHT: 65 IN | DIASTOLIC BLOOD PRESSURE: 82 MMHG | HEART RATE: 72 BPM | WEIGHT: 204.2 LBS | TEMPERATURE: 98.1 F | BODY MASS INDEX: 34.02 KG/M2 | SYSTOLIC BLOOD PRESSURE: 130 MMHG

## 2022-01-27 DIAGNOSIS — I10 BENIGN ESSENTIAL HYPERTENSION: ICD-10-CM

## 2022-01-27 DIAGNOSIS — Z12.31 SCREENING MAMMOGRAM FOR BREAST CANCER: ICD-10-CM

## 2022-01-27 DIAGNOSIS — Z00.00 ENCOUNTER FOR ANNUAL WELLNESS EXAM IN MEDICARE PATIENT: Primary | ICD-10-CM

## 2022-01-27 DIAGNOSIS — H25.9 AGE-RELATED CATARACT OF BOTH EYES, UNSPECIFIED AGE-RELATED CATARACT TYPE: ICD-10-CM

## 2022-01-27 DIAGNOSIS — H35.3220 EXUDATIVE AGE-RELATED MACULAR DEGENERATION OF LEFT EYE, UNSPECIFIED STAGE (HCC): ICD-10-CM

## 2022-01-27 DIAGNOSIS — Z01.818 PREOPERATIVE CLEARANCE: ICD-10-CM

## 2022-01-27 DIAGNOSIS — E78.2 MIXED HYPERLIPIDEMIA: ICD-10-CM

## 2022-01-27 DIAGNOSIS — R60.9 EDEMA, UNSPECIFIED TYPE: ICD-10-CM

## 2022-01-27 DIAGNOSIS — R73.09 ABNORMAL BLOOD SUGAR: ICD-10-CM

## 2022-01-27 DIAGNOSIS — M17.11 ARTHRITIS OF RIGHT KNEE: ICD-10-CM

## 2022-01-27 PROCEDURE — 1101F PT FALLS ASSESS-DOCD LE1/YR: CPT | Performed by: FAMILY MEDICINE

## 2022-01-27 PROCEDURE — 3725F SCREEN DEPRESSION PERFORMED: CPT | Performed by: FAMILY MEDICINE

## 2022-01-27 PROCEDURE — 99214 OFFICE O/P EST MOD 30 MIN: CPT | Performed by: FAMILY MEDICINE

## 2022-01-27 PROCEDURE — 3288F FALL RISK ASSESSMENT DOCD: CPT | Performed by: FAMILY MEDICINE

## 2022-01-27 PROCEDURE — 1036F TOBACCO NON-USER: CPT | Performed by: FAMILY MEDICINE

## 2022-01-27 PROCEDURE — 1125F AMNT PAIN NOTED PAIN PRSNT: CPT | Performed by: FAMILY MEDICINE

## 2022-01-27 PROCEDURE — 1160F RVW MEDS BY RX/DR IN RCRD: CPT | Performed by: FAMILY MEDICINE

## 2022-01-27 PROCEDURE — 1170F FXNL STATUS ASSESSED: CPT | Performed by: FAMILY MEDICINE

## 2022-01-27 PROCEDURE — G0439 PPPS, SUBSEQ VISIT: HCPCS | Performed by: FAMILY MEDICINE

## 2022-01-27 RX ORDER — FUROSEMIDE 20 MG/1
20 TABLET ORAL DAILY
COMMUNITY
End: 2022-01-27

## 2022-01-27 NOTE — ASSESSMENT & PLAN NOTE
Blood pressure reasonably controlled on losartan 50 b i d , metoprolol 25 daily, and hydrochlorothiazide 12 5 daily

## 2022-01-27 NOTE — ASSESSMENT & PLAN NOTE
Doing well on Vytorin 10/20  Patient is due for labs  Rx given    She states she will get this done after surgery

## 2022-01-27 NOTE — ASSESSMENT & PLAN NOTE
Patient presents for preop clearance for upcoming bilateral cataract surgery  Left eye on February 1st, right eye on February 15th  Surgeon is Dr Dunia Meek  Otherwise, patient is doing well  She is compliant with prescribed medications  Exam today is unremarkable      PATIENT IS MEDICALLY CLEARED FOR UPCOMING BILATERAL CATARACT SURGERIES

## 2022-01-27 NOTE — PROGRESS NOTES
[facility]      NAME: Taina Ibarra  AGE: 80 y o  SEX: female  : 1940   MRN: [id number]    DATE: 2022  TIME: 2:12 PM    Assessment and Plan     Problem List Items Addressed This Visit     Benign essential hypertension     Blood pressure reasonably controlled on losartan 50 b i d , metoprolol 25 daily, and hydrochlorothiazide 12 5 daily         Relevant Orders    CBC and differential    TSH, 3rd generation with Free T4 reflex    Edema     Doing well on furosemide 20 mg p r n  Hyperlipidemia     Doing well on Vytorin 10/20  Patient is due for labs  Rx given  She states she will get this done after surgery         Relevant Orders    Lipid Panel with Direct LDL reflex    Preoperative clearance     Patient presents for preop clearance for upcoming bilateral cataract surgery  Left eye on , right eye on   Surgeon is Dr Sharon Al  Otherwise, patient is doing well  She is compliant with prescribed medications  Exam today is unremarkable  PATIENT IS MEDICALLY CLEARED FOR UPCOMING BILATERAL CATARACT SURGERIES         Arthritis of right knee    Exudative age-related macular degeneration of left eye, unspecified stage ([address] )     Continue regular follow-up with her eye care specialist           Other Visit Diagnoses     Encounter for annual wellness exam in Medicare patient    -  Primary    Screening mammogram for breast cancer        Relevant Orders    Mammo screening bilateral w 3d & cad    Age-related cataract of both eyes, unspecified age-related cataract type        Abnormal blood sugar        Relevant Orders    Comprehensive metabolic panel    Hemoglobin A1C        Patient had COVID vaccination, and booster      Return to office in: 6 mos (?yearly labs)    Chief Complaint     Chief Complaint   Patient presents with    Medicare Wellness Visit     Subsequent    Pre-op Exam     Cataract removal- Left eye on  22 Right on 2/15/22 by   Sharon Al History of Present Illness     Patient presents for preop clearance for upcoming bilateral cataract surgery  Left eye on February 1st, right eye on February 15th  Surgeon is Dr Tutu Jasmine  Otherwise, patient is doing well  She is compliant with prescribed medications  She has not gotten her fasting blood work done yet  The following portions of the patient's history were reviewed and updated as appropriate: allergies, current medications, past family history, past medical history, past social history, past surgical history and problem list     Review of Systems   Review of Systems   Eyes: Positive for visual disturbance  Respiratory: Negative  Cardiovascular: Negative  Gastrointestinal: Negative  Genitourinary: Negative  Active Problem List     Patient Active Problem List   Diagnosis    Benign essential hypertension    Edema    Hyperlipidemia    Preoperative clearance    Arthritis of right knee    Arthritis    Exudative age-related macular degeneration of left eye, unspecified stage ([facility])       Objective   /82 (BP Location: Left arm, Patient Position: Sitting, Cuff Size: Standard)   Pulse 72   Temp 98 1 °F (36 7 °C) (Temporal)   Resp 18   Ht 5' 4 5" (1 638 m)   Wt 92 6 kg (204 lb 3 2 oz)   SpO2 96%   BMI 34 51 kg/m²     Physical Exam  Cardiovascular:      Rate and Rhythm: Normal rate and regular rhythm  Heart sounds: Normal heart sounds  Comments: Carotids: no bruits  Ext: no edema  Pulmonary:      Effort: Pulmonary effort is normal  No respiratory distress  Breath sounds: No wheezing or rales  Psychiatric:         Behavior: Behavior normal          Thought Content:  Thought content normal          Pertinent Laboratory/Diagnostic Studies:  last labs    Current Medications     Current Outpatient Medications:     Cholecalciferol (VITAMIN D3) 5000 units CAPS, Take 1 capsule by mouth daily  , Disp: , Rfl:     ezetimibe-simvastatin (VYTORIN) 10-20 mg per tablet, Take 1 tablet by mouth daily, Disp: 90 tablet, Rfl: 1    furosemide (LASIX) 20 mg tablet, Take 1 tablet (20 mg total) by mouth daily (Patient taking differently: Take 20 mg by mouth daily TAKEN AS NEEDED ), Disp: 90 tablet, Rfl: 1    hydrochlorothiazide (MICROZIDE) 12 5 mg capsule, Take 1 capsule (12 5 mg total) by mouth daily, Disp: 90 capsule, Rfl: 1    losartan (COZAAR) 50 mg tablet, Take 1 tablet (50 mg total) by mouth 2 (two) times a day, Disp: 180 tablet, Rfl: 0    metoprolol succinate (TOPROL-XL) 25 mg 24 hr tablet, Take 1 tablet (25 mg total) by mouth daily, Disp: 90 tablet, Rfl: 1    Multiple Vitamins-Minerals (PRESERVISION AREDS 2+MULTI VIT PO), Take by mouth, Disp: , Rfl:     Multiple Vitamins-Minerals (PRESERVISION AREDS 2+MULTI VIT PO), Take 1 capsule by mouth daily, Disp: , Rfl:     Health Maintenance     Health Maintenance   Topic Date Due    Hepatitis C Screening  Never done    SLP PLAN OF CARE  Never done    BMI: Followup Plan  Never done    DTaP,Tdap,and Td Vaccines (1 - Tdap) Never done    Breast Cancer Screening: Mammogram  Never done    Osteoporosis Screening  Never done    Influenza Vaccine (1) 09/01/2021    Fall Risk  01/27/2023    Depression Screening  01/27/2023    Medicare Annual Wellness Visit (AWV)  01/27/2023    BMI: Adult  01/27/2023    Pneumococcal Vaccine: 65+ Years  Completed    COVID-19 Vaccine  Completed    HIB Vaccine  Aged Out    Hepatitis B Vaccine  Aged Out    IPV Vaccine  Aged Out    Hepatitis A Vaccine  Aged Out    Meningococcal ACWY Vaccine  Aged Out    HPV Vaccine  Aged [employer] History   Administered Date(s) Administered    COVID-19 MODERNA VACC 0 5 ML IM 03/24/2021, 04/23/2021, 12/22/2021    INFLUENZA 11/28/2017    Influenza Split High Dose Preservative Free IM 11/28/2017    Pneumococcal Conjugate 13-Valent 08/04/2015    Pneumococcal Polysaccharide PPV23 07/19/2013       Christine Hood DO  [facility]

## 2022-01-27 NOTE — LETTER
2022     Shadi Raymond MD  [id number] [facility] [address]    Patient: Fadi Ortiz   YOB: 1940   Date of Visit: 2022       Dear Dr Cecliia Gonzalez: Thank you for referring Fadi Ortiz to me for evaluation  Below are my notes for this consultation  If you have questions, please do not hesitate to call me  I look forward to following your patient along with you  Sincerely,        Julito Mejia DO        CC: No Recipients  Julito Mejia DO  2022  2:12 PM  Incomplete  [facility]      NAME: Fadi Ortiz  AGE: 80 y o  SEX: female  : 1940   MRN: [id number]    DATE: 2022  TIME: 2:12 PM    Assessment and Plan     Problem List Items Addressed This Visit     Benign essential hypertension     Blood pressure reasonably controlled on losartan 50 b i d , metoprolol 25 daily, and hydrochlorothiazide 12 5 daily         Relevant Orders    CBC and differential    TSH, 3rd generation with Free T4 reflex    Edema     Doing well on furosemide 20 mg p r n  Hyperlipidemia     Doing well on Vytorin 10/20  Patient is due for labs  Rx given  She states she will get this done after surgery         Relevant Orders    Lipid Panel with Direct LDL reflex    Preoperative clearance     Patient presents for preop clearance for upcoming bilateral cataract surgery  Left eye on , right eye on   Surgeon is Dr Joe Romero  Otherwise, patient is doing well  She is compliant with prescribed medications  Exam today is unremarkable      PATIENT IS MEDICALLY CLEARED FOR UPCOMING BILATERAL CATARACT SURGERIES         Arthritis of right knee    Exudative age-related macular degeneration of left eye, unspecified stage ([address] )     Continue regular follow-up with her eye care specialist           Other Visit Diagnoses     Encounter for annual wellness exam in Medicare patient    -  Primary    Screening mammogram for breast cancer        Relevant Orders    Mammo screening bilateral w 3d & cad    Age-related cataract of both eyes, unspecified age-related cataract type        Abnormal blood sugar        Relevant Orders    Comprehensive metabolic panel    Hemoglobin A1C        Patient had COVID vaccination, and booster      Return to office in: 6 mos (?yearly labs)    Chief Complaint     Chief Complaint   Patient presents with   [facility] Wellness Visit     Subsequent    Pre-op Exam     Cataract removal- Left eye on  2/1/22 Right on 2/15/22 by  Marvin       History of Present Illness     Patient presents for preop clearance for upcoming bilateral cataract surgery  Left eye on February 1st, right eye on February 15th  Surgeon is Dr Juan Pablo Lobo  Otherwise, patient is doing well  She is compliant with prescribed medications  She has not gotten her fasting blood work done yet  The following portions of the patient's history were reviewed and updated as appropriate: allergies, current medications, past family history, past medical history, past social history, past surgical history and problem list     Review of Systems   Review of Systems   Eyes: Positive for visual disturbance  Respiratory: Negative  Cardiovascular: Negative  Gastrointestinal: Negative  Genitourinary: Negative  Active Problem List     Patient Active Problem List   Diagnosis    Benign essential hypertension    Edema    Hyperlipidemia    Preoperative clearance    Arthritis of right knee    Arthritis    Exudative age-related macular degeneration of left eye, unspecified stage ([facility])       Objective   /82 (BP Location: Left arm, Patient Position: Sitting, Cuff Size: Standard)   Pulse 72   Temp 98 1 °F (36 7 °C) (Temporal)   Resp 18   Ht 5' 4 5" (1 638 m)   Wt 92 6 kg (204 lb 3 2 oz)   SpO2 96%   BMI 34 51 kg/m²     Physical Exam  Cardiovascular:      Rate and Rhythm: Normal rate and regular rhythm        Heart sounds: Normal heart sounds  Comments: Carotids: no bruits  Ext: no edema  Pulmonary:      Effort: Pulmonary effort is normal  No respiratory distress  Breath sounds: No wheezing or rales  Psychiatric:         Behavior: Behavior normal          Thought Content:  Thought content normal          Pertinent Laboratory/Diagnostic Studies:  last labs    Current Medications     Current Outpatient Medications:     Cholecalciferol (VITAMIN D3) 5000 units CAPS, Take 1 capsule by mouth daily  , Disp: , Rfl:     ezetimibe-simvastatin (VYTORIN) 10-20 mg per tablet, Take 1 tablet by mouth daily, Disp: 90 tablet, Rfl: 1    furosemide (LASIX) 20 mg tablet, Take 1 tablet (20 mg total) by mouth daily (Patient taking differently: Take 20 mg by mouth daily TAKEN AS NEEDED ), Disp: 90 tablet, Rfl: 1    hydrochlorothiazide (MICROZIDE) 12 5 mg capsule, Take 1 capsule (12 5 mg total) by mouth daily, Disp: 90 capsule, Rfl: 1    losartan (COZAAR) 50 mg tablet, Take 1 tablet (50 mg total) by mouth 2 (two) times a day, Disp: 180 tablet, Rfl: 0    metoprolol succinate (TOPROL-XL) 25 mg 24 hr tablet, Take 1 tablet (25 mg total) by mouth daily, Disp: 90 tablet, Rfl: 1    Multiple Vitamins-Minerals (PRESERVISION AREDS 2+MULTI VIT PO), Take by mouth, Disp: , Rfl:     Multiple Vitamins-Minerals (PRESERVISION AREDS 2+MULTI VIT PO), Take 1 capsule by mouth daily, Disp: , Rfl:     Health Maintenance     Health Maintenance   Topic Date Due    Hepatitis C Screening  Never done    SLP PLAN OF CARE  Never done    BMI: Followup Plan  Never done    DTaP,Tdap,and Td Vaccines (1 - Tdap) Never done    Breast Cancer Screening: Mammogram  Never done    Osteoporosis Screening  Never done    Influenza Vaccine (1) 09/01/2021    Fall Risk  01/27/2023    Depression Screening  01/27/2023    Medicare Annual Wellness Visit (AWV)  01/27/2023    BMI: Adult  01/27/2023    Pneumococcal Vaccine: 65+ Years  Completed    COVID-19 Vaccine  Completed    HIB Vaccine Aged Out    Hepatitis B Vaccine  Aged Out    IPV Vaccine  Aged Out    Hepatitis A Vaccine  Aged Out    Meningococcal ACWY Vaccine  Aged Out    HPV Vaccine  Aged Out     Immunization History   Administered Date(s) Administered    COVID-19 MODERNA VACC 0 5 ML IM 2021, 2021, 2021    INFLUENZA 2017    Influenza Split High Dose Preservative Free IM 2017    Pneumococcal Conjugate 13-Valent 2015    Pneumococcal Polysaccharide PPV23 2013       Renaldo Horton DO  [facility]    Renaldo Horton DO  2022  1:59 PM  Sign when Signing Visit  [facility]      NAME: Rd Gutierrez  AGE: 80 y o  SEX: female  : 1940   MRN: [id number]    DATE: 2022  TIME: 1:56 PM    Assessment and Plan     Problem List Items Addressed This Visit     Benign essential hypertension    Relevant Medications    furosemide (LASIX) 20 mg tablet    Edema    Hyperlipidemia    Preoperative clearance    Arthritis of right knee      Other Visit Diagnoses     Encounter for annual wellness exam in Medicare patient    -  Primary    Screening mammogram for breast cancer        Age-related cataract of both eyes, unspecified age-related cataract type                  Return to office in: 6 mos    Chief Complaint     Chief Complaint   Patient presents with   [facility] Wellness Visit     Subsequent    Pre-op Exam     Cataract removal- Left eye on  22 Right on 2/15/22 by   Marvin       History of Present Illness     HPI    The following portions of the patient's history were reviewed and updated as appropriate: allergies, current medications, past family history, past medical history, past social history, past surgical history and problem list     Review of Systems   Review of Systems    Active Problem List     Patient Active Problem List   Diagnosis    Benign essential hypertension    Edema    Hyperlipidemia    Preoperative clearance    Arthritis of right knee    Arthritis       Objective   /82 (BP Location: Left arm, Patient Position: Sitting, Cuff Size: Standard)   Pulse 72   Temp 98 1 °F (36 7 °C) (Temporal)   Resp 18   Ht 5' 4 5" (1 638 m)   Wt 92 6 kg (204 lb 3 2 oz)   SpO2 96%   BMI 34 51 kg/m²     Physical Exam    Pertinent Laboratory/Diagnostic Studies:  last labs    Current Medications     Current Outpatient Medications:     Cholecalciferol (VITAMIN D3) 5000 units CAPS, Take 1 capsule by mouth daily  , Disp: , Rfl:     ezetimibe-simvastatin (VYTORIN) 10-20 mg per tablet, Take 1 tablet by mouth daily, Disp: 90 tablet, Rfl: 1    furosemide (LASIX) 20 mg tablet, Take 1 tablet (20 mg total) by mouth daily (Patient taking differently: Take 20 mg by mouth daily TAKEN AS NEEDED ), Disp: 90 tablet, Rfl: 1    hydrochlorothiazide (MICROZIDE) 12 5 mg capsule, Take 1 capsule (12 5 mg total) by mouth daily, Disp: 90 capsule, Rfl: 1    losartan (COZAAR) 50 mg tablet, Take 1 tablet (50 mg total) by mouth 2 (two) times a day, Disp: 180 tablet, Rfl: 0    metoprolol succinate (TOPROL-XL) 25 mg 24 hr tablet, Take 1 tablet (25 mg total) by mouth daily, Disp: 90 tablet, Rfl: 1    Multiple Vitamins-Minerals (PRESERVISION AREDS 2+MULTI VIT PO), Take by mouth, Disp: , Rfl:     Multiple Vitamins-Minerals (PRESERVISION AREDS 2+MULTI VIT PO), Take 1 capsule by mouth daily, Disp: , Rfl:     furosemide (LASIX) 20 mg tablet, Take 20 mg by mouth daily (Patient not taking: Reported on 1/27/2022 ), Disp: , Rfl:     Health Maintenance     Health Maintenance   Topic Date Due    Hepatitis C Screening  Never done    SLP PLAN OF CARE  Never done    BMI: Followup Plan  Never done    BMI: Adult  Never done    DTaP,Tdap,and Td Vaccines (1 - Tdap) Never done    Breast Cancer Screening: Mammogram  Never done    Osteoporosis Screening  Never done    Influenza Vaccine (1) 09/01/2021    Fall Risk  01/27/2023    Depression Screening  01/27/2023    Medicare Annual Wellness Visit (AWV)  01/27/2023    Pneumococcal Vaccine: 65+ Years  Completed    COVID-19 Vaccine  Completed    HIB Vaccine  Aged Out    Hepatitis B Vaccine  Aged Out    IPV Vaccine  Aged Out    Hepatitis A Vaccine  Aged Out    Meningococcal ACWY Vaccine  Aged Out    HPV Vaccine  Aged Out     Immunization History   Administered Date(s) Administered    COVID-19 MODERNA VACC 0 5 ML IM 03/24/2021, 04/23/2021, 12/22/2021    INFLUENZA 11/28/2017    Influenza Split High Dose Preservative Free IM 11/28/2017    Pneumococcal Conjugate 13-Valent 08/04/2015    Pneumococcal Polysaccharide PPV23 07/19/2013       Alma Rosa Rivera DO  [facility]    Alma Rosa Rivera DO  1/27/2022  1:55 PM  Sign when Signing Visit   Assessment and Plan:   MEDICARE WELLNESS VISIT    Problem List Items Addressed This Visit     Benign essential hypertension    Relevant Medications    furosemide (LASIX) 20 mg tablet    Edema    Hyperlipidemia    Arthritis of right knee      Other Visit Diagnoses     Encounter for annual wellness exam in Medicare patient    -  Primary    Screening mammogram for breast cancer               Preventive health issues were discussed with patient, and age appropriate screening tests were ordered as noted in patient's After Visit Summary  Personalized health advice and appropriate referrals for health education or preventive services given if needed, as noted in patient's After Visit Summary       History of Present Illness:     Patient presents for Medicare Annual Wellness visit    Patient Care Team:  Alma Rosa Rivera DO as PCP - General (Family Medicine)  Alma Rosa Rivera DO as PCP - PCP-[facility]  DO Louisa Hawkins MD     Problem List:     Patient Active Problem List   Diagnosis    Benign essential hypertension    Edema    Hyperlipidemia    Arthritis of right knee    Arthritis      Past Medical and Surgical History:     Past Medical History:   Diagnosis Date    Accelerated essential hypertension     last assessed: 4/27/15    Cataract     Fatigue     last assessed: 14    Hyperlipidemia     Hypertension     Obesity      Past Surgical History:   Procedure Laterality Date    EYE SURGERY      FOOT SURGERY        Family History:     Family History   Problem Relation Age of Onset    Coronary artery disease Father     Arthritis Father     Diabetes Father     Cancer Father     Arthritis Other     Diabetes Other       Social History:     Social History     Socioeconomic History    Marital status: /Civil Union     Spouse name: None    Number of children: 2    Years of education: None    Highest education level: None   Occupational History    None   Tobacco Use    Smoking status: Former Smoker     Packs/day: 1 00     Quit date:      Years since quittin 0    Smokeless tobacco: Never Used   Vaping Use    Vaping Use: Never used   Substance and Sexual Activity    Alcohol use: Yes     Comment: occasional    Drug use: No    Sexual activity: Not Currently     Partners: Male   Other Topics Concern    None   Social History Narrative    Lives with spouse     Social Determinants of Health     Financial Resource Strain: Not on file   Food Insecurity: Not on file   Transportation Needs: Not on file   Physical Activity: Not on file   Stress: Not on file   Social Connections: Not on file   Intimate Partner Violence: Not on file   Housing Stability: Not on file      Medications and Allergies:     Current Outpatient Medications   Medication Sig Dispense Refill    Cholecalciferol (VITAMIN D3) 5000 units CAPS Take 1 capsule by mouth daily        ezetimibe-simvastatin (VYTORIN) 10-20 mg per tablet Take 1 tablet by mouth daily 90 tablet 1    furosemide (LASIX) 20 mg tablet Take 1 tablet (20 mg total) by mouth daily (Patient taking differently: Take 20 mg by mouth daily TAKEN AS NEEDED ) 90 tablet 1    hydrochlorothiazide (MICROZIDE) 12 5 mg capsule Take 1 capsule (12 5 mg total) by mouth daily 90 capsule 1    losartan (COZAAR) 50 mg tablet Take 1 tablet (50 mg total) by mouth 2 (two) times a day 180 tablet 0    metoprolol succinate (TOPROL-XL) 25 mg 24 hr tablet Take 1 tablet (25 mg total) by mouth daily 90 tablet 1    Multiple Vitamins-Minerals (PRESERVISION AREDS 2+MULTI VIT PO) Take by mouth      Multiple Vitamins-Minerals (PRESERVISION AREDS 2+MULTI VIT PO) Take 1 capsule by mouth daily      furosemide (LASIX) 20 mg tablet Take 20 mg by mouth daily (Patient not taking: Reported on 1/27/2022 )       No current facility-administered medications for this visit  Allergies   Allergen Reactions    Sulfa Antibiotics Anaphylaxis      Immunizations:     Immunization History   Administered Date(s) Administered    COVID-19 MODERNA VACC 0 5 ML IM 03/24/2021, 04/23/2021, 12/22/2021    INFLUENZA 11/28/2017    Influenza Split High Dose Preservative Free IM 11/28/2017    Pneumococcal Conjugate 13-Valent 08/04/2015    Pneumococcal Polysaccharide PPV23 07/19/2013      Health Maintenance:         Topic Date Due    Hepatitis C Screening  Never done    Breast Cancer Screening: Mammogram  Never done         Topic Date Due    DTaP,Tdap,and Td Vaccines (1 - Tdap) Never done    Influenza Vaccine (1) 09/01/2021      Medicare Health Risk Assessment:     /82 (BP Location: Left arm, Patient Position: Sitting, Cuff Size: Standard)   Pulse 72   Temp 98 1 °F (36 7 °C) (Temporal)   Resp 18   Ht 5' 4 5" (1 638 m)   Wt 92 6 kg (204 lb 3 2 oz)   SpO2 96%   BMI 34 51 kg/m²      Rancho is here for her Subsequent Wellness visit  Health Risk Assessment:   Patient rates overall health as very good  Patient feels that their physical health rating is slightly better  Patient is satisfied with their life  Eyesight was rated as slightly worse  Hearing was rated as same  Patient feels that their emotional and mental health rating is same   Patients states they are never, rarely angry  Patient states they are always unusually tired/fatigued  Pain experienced in the last 7 days has been none  Patient states that she has experienced no weight loss or gain in last 6 months  Depression Screening:   PHQ-2 Score: 0      Fall Risk Screening: In the past year, patient has experienced: history of falling in past year    Number of falls: 1  Injured during fall?: No    Feels unsteady when standing or walking?: Yes    Worried about falling?: No      Urinary Incontinence Screening:   Patient has leaked urine accidently in the last six months  Home Safety:  Patient has trouble with stairs inside or outside of their home  Patient has working smoke alarms and has working carbon monoxide detector  Home safety hazards include: none  KNEE ISSUES    Nutrition:   Current diet is Frequent junk food and Regular  Medications:   Patient is currently taking over-the-counter supplements  OTC medications include: see medication list  Patient is able to manage medications  Activities of Daily Living (ADLs)/Instrumental Activities of Daily Living (IADLs):   Walk and transfer into and out of bed and chair?: Yes  Dress and groom yourself?: Yes    Bathe or shower yourself?: Yes    Feed yourself?  Yes  Do your laundry/housekeeping?: Yes  Manage your money, pay your bills and track your expenses?: Yes  Make your own meals?: Yes    Do your own shopping?: Yes    Previous Hospitalizations:   Any hospitalizations or ED visits within the last 12 months?: No      Advance Care Planning:   Living will: No    Durable POA for healthcare: No    Advanced directive: No    Five wishes given: Yes      PREVENTIVE SCREENINGS      Cardiovascular Screening:    General: Screening Not Indicated and History Lipid Disorder      Diabetes Screening:     General: Risks and Benefits Discussed      Colorectal Cancer Screening:     General: Risks and Benefits Discussed      Breast Cancer Screening:     General: Risks and Benefits Discussed      Cervical Cancer Screening:    General: Screening Not Indicated      Osteoporosis Screening:    General: Risks and Benefits Discussed      Abdominal Aortic Aneurysm (AAA) Screening:        General: Risks and Benefits Discussed      Lung Cancer Screening:     General: Screening Not Indicated      Hepatitis C Screening:    General: Risks and Benefits Discussed    Screening, Brief Intervention, and Referral to Treatment (SBIRT)    Screening  Typical number of drinks in a day: 0  Typical number of drinks in a week: 0  Interpretation: Low risk drinking behavior      AUDIT-C Screenin) How often did you have a drink containing alcohol in the past year? never  2) How many drinks did you have on a typical day when you were drinking in the past year? 0  3) How often did you have 6 or more drinks on one occasion in the past year? never    AUDIT-C Score: 0  Interpretation: Score 0-2 (female): Negative screen for alcohol misuse    Single Item Drug Screening:  How often have you used an illegal drug (including marijuana) or a prescription medication for non-medical reasons in the past year? never    Single Item Drug Screen Score: 0  Interpretation: Negative screen for possible drug use disorder      Alfredito Butts, DO

## 2022-01-27 NOTE — PROGRESS NOTES
Assessment and Plan:   MEDICARE WELLNESS VISIT    Problem List Items Addressed This Visit     Benign essential hypertension    Relevant Medications    furosemide (LASIX) 20 mg tablet    Edema    Hyperlipidemia    Arthritis of right knee      Other Visit Diagnoses     Encounter for annual wellness exam in Medicare patient    -  Primary    Screening mammogram for breast cancer               Preventive health issues were discussed with patient, and age appropriate screening tests were ordered as noted in patient's After Visit Summary  Personalized health advice and appropriate referrals for health education or preventive services given if needed, as noted in patient's After Visit Summary       History of Present Illness:     Patient presents for Medicare Annual Wellness visit    Patient Care Team:  Mt Smith DO as PCP - General (Family Medicine)  Mt Smith DO as PCP - PCP-[facility] DO Khanh Tavares MD     Problem List:     Patient Active Problem List   Diagnosis    Benign essential hypertension    Edema    Hyperlipidemia    Arthritis of right knee    Arthritis      Past Medical and Surgical History:     Past Medical History:   Diagnosis Date    Accelerated essential hypertension     last assessed: 4/27/15    Cataract     Fatigue     last assessed: 1/21/14    Hyperlipidemia     Hypertension     Obesity      Past Surgical History:   Procedure Laterality Date    EYE SURGERY      FOOT SURGERY        Family History:     Family History   Problem Relation Age of Onset    Coronary artery disease Father     Arthritis Father     Diabetes Father     Cancer Father     Arthritis Other     Diabetes Other       Social History:     Social History     Socioeconomic History    Marital status: /Civil Union     Spouse name: None    Number of children: 2    Years of education: None    Highest education level: None   Occupational History    None   Tobacco Use  Smoking status: Former Smoker     Packs/day: 1 00     Quit date:      Years since quittin 0    Smokeless tobacco: Never Used   Vaping Use    Vaping Use: Never used   Substance and Sexual Activity    Alcohol use: Yes     Comment: occasional    Drug use: No    Sexual activity: Not Currently     Partners: Male   Other Topics Concern    None   Social History Narrative    Lives with spouse     Social Determinants of Health     Financial Resource Strain: Not on file   Food Insecurity: Not on file   Transportation Needs: Not on file   Physical Activity: Not on file   Stress: Not on file   Social Connections: Not on file   Intimate Partner Violence: Not on file   Housing Stability: Not on file      Medications and Allergies:     Current Outpatient Medications   Medication Sig Dispense Refill    Cholecalciferol (VITAMIN D3) 5000 units CAPS Take 1 capsule by mouth daily        ezetimibe-simvastatin (VYTORIN) 10-20 mg per tablet Take 1 tablet by mouth daily 90 tablet 1    furosemide (LASIX) 20 mg tablet Take 1 tablet (20 mg total) by mouth daily (Patient taking differently: Take 20 mg by mouth daily TAKEN AS NEEDED ) 90 tablet 1    hydrochlorothiazide (MICROZIDE) 12 5 mg capsule Take 1 capsule (12 5 mg total) by mouth daily 90 capsule 1    losartan (COZAAR) 50 mg tablet Take 1 tablet (50 mg total) by mouth 2 (two) times a day 180 tablet 0    metoprolol succinate (TOPROL-XL) 25 mg 24 hr tablet Take 1 tablet (25 mg total) by mouth daily 90 tablet 1    Multiple Vitamins-Minerals (PRESERVISION AREDS 2+MULTI VIT PO) Take by mouth      Multiple Vitamins-Minerals (PRESERVISION AREDS 2+MULTI VIT PO) Take 1 capsule by mouth daily      furosemide (LASIX) 20 mg tablet Take 20 mg by mouth daily (Patient not taking: Reported on 2022 )       No current facility-administered medications for this visit       Allergies   Allergen Reactions    Sulfa Antibiotics Anaphylaxis      Immunizations:     Immunization History   Administered Date(s) Administered    COVID-19 MODERNA VACC 0 5 ML IM 03/24/2021, 04/23/2021, 12/22/2021    INFLUENZA 11/28/2017    Influenza Split High Dose Preservative Free IM 11/28/2017    Pneumococcal Conjugate 13-Valent 08/04/2015    Pneumococcal Polysaccharide PPV23 07/19/2013      Health Maintenance:         Topic Date Due    Hepatitis C Screening  Never done    Breast Cancer Screening: Mammogram  Never done         Topic Date Due    DTaP,Tdap,and Td Vaccines (1 - Tdap) Never done    Influenza Vaccine (1) 09/01/2021      Medicare Health Risk Assessment:     /82 (BP Location: Left arm, Patient Position: Sitting, Cuff Size: Standard)   Pulse 72   Temp 98 1 °F (36 7 °C) (Temporal)   Resp 18   Ht 5' 4 5" (1 638 m)   Wt 92 6 kg (204 lb 3 2 oz)   SpO2 96%   BMI 34 51 kg/m²      Rancho is here for her Subsequent Wellness visit  Health Risk Assessment:   Patient rates overall health as very good  Patient feels that their physical health rating is slightly better  Patient is satisfied with their life  Eyesight was rated as slightly worse  Hearing was rated as same  Patient feels that their emotional and mental health rating is same  Patients states they are never, rarely angry  Patient states they are always unusually tired/fatigued  Pain experienced in the last 7 days has been none  Patient states that she has experienced no weight loss or gain in last 6 months  Depression Screening:   PHQ-2 Score: 0      Fall Risk Screening: In the past year, patient has experienced: history of falling in past year    Number of falls: 1  Injured during fall?: No    Feels unsteady when standing or walking?: Yes    Worried about falling?: No      Urinary Incontinence Screening:   Patient has leaked urine accidently in the last six months  Home Safety:  Patient has trouble with stairs inside or outside of their home   Patient has working smoke alarms and has working carbon monoxide detector  Home safety hazards include: none  KNEE ISSUES    Nutrition:   Current diet is Frequent junk food and Regular  Medications:   Patient is currently taking over-the-counter supplements  OTC medications include: see medication list  Patient is able to manage medications  Activities of Daily Living (ADLs)/Instrumental Activities of Daily Living (IADLs):   Walk and transfer into and out of bed and chair?: Yes  Dress and groom yourself?: Yes    Bathe or shower yourself?: Yes    Feed yourself? Yes  Do your laundry/housekeeping?: Yes  Manage your money, pay your bills and track your expenses?: Yes  Make your own meals?: Yes    Do your own shopping?: Yes    Previous Hospitalizations:   Any hospitalizations or ED visits within the last 12 months?: No      Advance Care Planning:   Living will: No    Durable POA for healthcare: No    Advanced directive: No    Five wishes given: Yes      PREVENTIVE SCREENINGS      Cardiovascular Screening:    General: Screening Not Indicated and History Lipid Disorder      Diabetes Screening:     General: Risks and Benefits Discussed      Colorectal Cancer Screening:     General: Risks and Benefits Discussed      Breast Cancer Screening:     General: Risks and Benefits Discussed      Cervical Cancer Screening:    General: Screening Not Indicated      Osteoporosis Screening:    General: Risks and Benefits Discussed      Abdominal Aortic Aneurysm (AAA) Screening:        General: Risks and Benefits Discussed      Lung Cancer Screening:     General: Screening Not Indicated      Hepatitis C Screening:    General: Risks and Benefits Discussed    Screening, Brief Intervention, and Referral to Treatment (SBIRT)    Screening  Typical number of drinks in a day: 0  Typical number of drinks in a week: 0  Interpretation: Low risk drinking behavior      AUDIT-C Screenin) How often did you have a drink containing alcohol in the past year? never  2) How many drinks did you have on a typical day when you were drinking in the past year? 0  3) How often did you have 6 or more drinks on one occasion in the past year? never    AUDIT-C Score: 0  Interpretation: Score 0-2 (female): Negative screen for alcohol misuse    Single Item Drug Screening:  How often have you used an illegal drug (including marijuana) or a prescription medication for non-medical reasons in the past year? never    Single Item Drug Screen Score: 0  Interpretation: Negative screen for possible drug use disorder      Reynaldo Cardenas, DO

## 2022-07-21 ENCOUNTER — RA CDI HCC (OUTPATIENT)
Dept: OTHER | Facility: HOSPITAL | Age: 82
End: 2022-07-21

## 2022-07-21 NOTE — PROGRESS NOTES
[facility] [place] 75  coding opportunities       Chart reviewed, no opportunity found: CHART REVIEWED, NO OPPORTUNITY FOUND        Patients Insurance     Medicare Insurance: [employer] [facility] Advantage

## 2022-07-28 ENCOUNTER — APPOINTMENT (OUTPATIENT)
Dept: LAB | Facility: CLINIC | Age: 82
End: 2022-07-28
Payer: COMMERCIAL

## 2022-07-28 DIAGNOSIS — R73.09 ABNORMAL BLOOD SUGAR: ICD-10-CM

## 2022-07-28 DIAGNOSIS — I10 BENIGN ESSENTIAL HYPERTENSION: ICD-10-CM

## 2022-07-28 DIAGNOSIS — E78.2 MIXED HYPERLIPIDEMIA: ICD-10-CM

## 2022-07-28 LAB
ALBUMIN SERPL BCP-MCNC: 3.5 G/DL (ref 3.5–5)
ALP SERPL-CCNC: 129 U/L (ref 46–116)
ALT SERPL W P-5'-P-CCNC: 19 U/L (ref 12–78)
ANION GAP SERPL CALCULATED.3IONS-SCNC: 3 MMOL/L (ref 4–13)
AST SERPL W P-5'-P-CCNC: 18 U/L (ref 5–45)
BASOPHILS # BLD AUTO: 0.04 THOUSANDS/ΜL (ref 0–0.1)
BASOPHILS NFR BLD AUTO: 0 % (ref 0–1)
BILIRUB SERPL-MCNC: 0.43 MG/DL (ref 0.2–1)
BUN SERPL-MCNC: 20 MG/DL (ref 5–25)
CALCIUM SERPL-MCNC: 10.8 MG/DL (ref 8.3–10.1)
CHLORIDE SERPL-SCNC: 108 MMOL/L (ref 96–108)
CHOLEST SERPL-MCNC: 183 MG/DL
CO2 SERPL-SCNC: 29 MMOL/L (ref 21–32)
CREAT SERPL-MCNC: 0.62 MG/DL (ref 0.6–1.3)
EOSINOPHIL # BLD AUTO: 0.14 THOUSAND/ΜL (ref 0–0.61)
EOSINOPHIL NFR BLD AUTO: 2 % (ref 0–6)
ERYTHROCYTE [DISTWIDTH] IN BLOOD BY AUTOMATED COUNT: 13.2 % (ref 11.6–15.1)
EST. AVERAGE GLUCOSE BLD GHB EST-MCNC: 111 MG/DL
GFR SERPL CREATININE-BSD FRML MDRD: 85 ML/MIN/1.73SQ M
GLUCOSE P FAST SERPL-MCNC: 88 MG/DL (ref 65–99)
HBA1C MFR BLD: 5.5 %
HCT VFR BLD AUTO: 44.4 % (ref 34.8–46.1)
HDLC SERPL-MCNC: 53 MG/DL
HGB BLD-MCNC: 13.4 G/DL (ref 11.5–15.4)
IMM GRANULOCYTES # BLD AUTO: 0.02 THOUSAND/UL (ref 0–0.2)
IMM GRANULOCYTES NFR BLD AUTO: 0 % (ref 0–2)
LDLC SERPL CALC-MCNC: 95 MG/DL (ref 0–100)
LYMPHOCYTES # BLD AUTO: 2.18 THOUSANDS/ΜL (ref 0.6–4.47)
LYMPHOCYTES NFR BLD AUTO: 23 % (ref 14–44)
MCH RBC QN AUTO: 28.1 PG (ref 26.8–34.3)
MCHC RBC AUTO-ENTMCNC: 30.2 G/DL (ref 31.4–37.4)
MCV RBC AUTO: 93 FL (ref 82–98)
MONOCYTES # BLD AUTO: 0.94 THOUSAND/ΜL (ref 0.17–1.22)
MONOCYTES NFR BLD AUTO: 10 % (ref 4–12)
NEUTROPHILS # BLD AUTO: 6.33 THOUSANDS/ΜL (ref 1.85–7.62)
NEUTS SEG NFR BLD AUTO: 65 % (ref 43–75)
NRBC BLD AUTO-RTO: 0 /100 WBCS
PLATELET # BLD AUTO: 354 THOUSANDS/UL (ref 149–390)
PMV BLD AUTO: 11 FL (ref 8.9–12.7)
POTASSIUM SERPL-SCNC: 4.1 MMOL/L (ref 3.5–5.3)
PROT SERPL-MCNC: 7.7 G/DL (ref 6.4–8.4)
RBC # BLD AUTO: 4.77 MILLION/UL (ref 3.81–5.12)
SODIUM SERPL-SCNC: 140 MMOL/L (ref 135–147)
TRIGL SERPL-MCNC: 174 MG/DL
TSH SERPL DL<=0.05 MIU/L-ACNC: 4.07 UIU/ML (ref 0.45–4.5)
WBC # BLD AUTO: 9.65 THOUSAND/UL (ref 4.31–10.16)

## 2022-07-28 PROCEDURE — 80053 COMPREHEN METABOLIC PANEL: CPT

## 2022-07-28 PROCEDURE — 84443 ASSAY THYROID STIM HORMONE: CPT

## 2022-07-28 PROCEDURE — 36415 COLL VENOUS BLD VENIPUNCTURE: CPT

## 2022-07-28 PROCEDURE — 83036 HEMOGLOBIN GLYCOSYLATED A1C: CPT

## 2022-07-28 PROCEDURE — 85025 COMPLETE CBC W/AUTO DIFF WBC: CPT

## 2022-07-28 PROCEDURE — 80061 LIPID PANEL: CPT

## 2022-10-11 ENCOUNTER — RA CDI HCC (OUTPATIENT)
Dept: OTHER | Facility: HOSPITAL | Age: 82
End: 2022-10-11

## 2022-10-11 NOTE — PROGRESS NOTES
Ronit [facility] 75  coding opportunities       Chart reviewed, no opportunity found: CHART REVIEWED, NO OPPORTUNITY FOUND        Patients Insurance     Medicare Insurance: [employer] [facility] Advantage

## 2022-10-17 ENCOUNTER — OFFICE VISIT (OUTPATIENT)
Dept: FAMILY MEDICINE CLINIC | Facility: CLINIC | Age: 82
End: 2022-10-17
Payer: COMMERCIAL

## 2022-10-17 VITALS
WEIGHT: 180 LBS | TEMPERATURE: 97.7 F | BODY MASS INDEX: 30.73 KG/M2 | RESPIRATION RATE: 16 BRPM | HEART RATE: 73 BPM | SYSTOLIC BLOOD PRESSURE: 132 MMHG | OXYGEN SATURATION: 97 % | DIASTOLIC BLOOD PRESSURE: 72 MMHG | HEIGHT: 64 IN

## 2022-10-17 DIAGNOSIS — I10 BENIGN ESSENTIAL HYPERTENSION: Primary | ICD-10-CM

## 2022-10-17 DIAGNOSIS — R60.9 EDEMA, UNSPECIFIED TYPE: ICD-10-CM

## 2022-10-17 DIAGNOSIS — E66.09 CLASS 1 OBESITY DUE TO EXCESS CALORIES WITHOUT SERIOUS COMORBIDITY WITH BODY MASS INDEX (BMI) OF 30.0 TO 30.9 IN ADULT: ICD-10-CM

## 2022-10-17 DIAGNOSIS — E78.2 MIXED HYPERLIPIDEMIA: ICD-10-CM

## 2022-10-17 DIAGNOSIS — M19.90 ARTHRITIS: ICD-10-CM

## 2022-10-17 PROBLEM — E66.811 CLASS 1 OBESITY DUE TO EXCESS CALORIES WITHOUT SERIOUS COMORBIDITY WITH BODY MASS INDEX (BMI) OF 30.0 TO 30.9 IN ADULT: Status: ACTIVE | Noted: 2022-10-17

## 2022-10-17 PROBLEM — Z01.818 PREOPERATIVE CLEARANCE: Status: RESOLVED | Noted: 2018-07-31 | Resolved: 2022-10-17

## 2022-10-17 PROCEDURE — 99214 OFFICE O/P EST MOD 30 MIN: CPT | Performed by: FAMILY MEDICINE

## 2022-10-17 NOTE — ASSESSMENT & PLAN NOTE
Stable on occasional  use of Aleve  I advised patient to use only sparingly  Advised her to take Tylenol PRN

## 2022-10-17 NOTE — ASSESSMENT & PLAN NOTE
Cholesterol from July 28th 183  Doing well on Vytorin 10/20  Continue low-fat diet exercise  Patient has lost 24 lb since last visit    Will continue to monitor

## 2022-10-17 NOTE — ASSESSMENT & PLAN NOTE
Weight today 180, was 204 last visit (January 2022)  Patient has been following [employer] diet plan    Will continue to monitor

## 2022-10-17 NOTE — ASSESSMENT & PLAN NOTE
Blood pressure reasonably controlled on losartan 50 b i d , metoprolol 25 mg daily, and hydrochlorothiazide 12 5 mg daily

## 2022-10-17 NOTE — PROGRESS NOTES
Name: Rd Gutierrez      : 1940      MRN: [id number]  Encounter Provider: Renaldo Horton DO  Encounter Date: 10/17/2022   Encounter department: [address] [place] [address]     1  Benign essential hypertension  Assessment & Plan:  Blood pressure reasonably controlled on losartan 50 b i d , metoprolol 25 mg daily, and hydrochlorothiazide 12 5 mg daily      2  Mixed hyperlipidemia  Assessment & Plan:  Cholesterol from  183  Doing well on Vytorin 10/20  Continue low-fat diet exercise  Patient has lost 24 lb since last visit  Will continue to monitor      3  Edema, unspecified type  Assessment & Plan:  Stable/minimal lower extremity edema  Doing well on furosemide 20 mg daily      4  Arthritis  Assessment & Plan:  Stable on occasional  use of Aleve  I advised patient to use only sparingly  Advised her to take Tylenol PRN  5  Class 1 obesity due to excess calories without serious comorbidity with body mass index (BMI) of 30 0 to 30 9 in adult  Assessment & Plan:  Weight today 180, was 204 last visit (2022)  Patient has been following [employer] diet plan  Will continue to monitor          pt had Covid vaccination  Pt had pneumovax  Pt had Zostavax (discussed Shingrix)  Advised pt to get tetanus booster at pharmacy  Discussed flu shot (pt will get soon)    6 MOS, YEARLY LABS 2023       Subjective     Patient presents recheck chronic medical problems today  Overall she is feeling well  She is compliant with prescribed medications  She had labs done on     Review of Systems   Respiratory: Negative  Cardiovascular: Negative  Gastrointestinal: Negative  Genitourinary: Negative          Past Medical History:   Diagnosis Date   • Accelerated essential hypertension     last assessed: 4/27/15   • Cataract    • Fatigue     last assessed: 14   • Hyperlipidemia    • Hypertension    • Obesity      Past Surgical History:   Procedure Laterality Date   • EYE SURGERY     • FOOT SURGERY       Family History   Problem Relation Age of Onset   • Coronary artery disease Father    • Arthritis Father    • Diabetes Father    • Cancer Father    • Arthritis Other    • Diabetes Other      Social History     Socioeconomic History   • Marital status: /Civil Union     Spouse name: None   • Number of children: 2   • Years of education: None   • Highest education level: None   Occupational History   • None   Tobacco Use   • Smoking status: Former Smoker     Packs/day: 1 00     Quit date:      Years since quittin 8   • Smokeless tobacco: Never Used   Vaping Use   • Vaping Use: Never used   Substance and Sexual Activity   • Alcohol use: Yes     Comment: occasional   • Drug use: No   • Sexual activity: Not Currently     Partners: Male   Other Topics Concern   • None   Social History Narrative    Lives with spouse     Social Determinants of Health     Financial Resource Strain: Not on file   Food Insecurity: Not on file   Transportation Needs: Not on file   Physical Activity: Not on file   Stress: Not on file   Social Connections: Not on file   Intimate Partner Violence: Not on file   Housing Stability: Not on file     Current Outpatient Medications on File Prior to Visit   Medication Sig   • Cholecalciferol (VITAMIN D3) 5000 units CAPS Take 1 capsule by mouth daily     • ezetimibe-simvastatin (VYTORIN) 10-20 mg per tablet Take 1 tablet by mouth daily   • furosemide (LASIX) 20 mg tablet Take 1 tablet (20 mg total) by mouth daily (Patient taking differently: Take 20 mg by mouth daily TAKEN AS NEEDED)   • hydrochlorothiazide (MICROZIDE) 12 5 mg capsule Take 1 capsule (12 5 mg total) by mouth daily   • losartan (COZAAR) 50 mg tablet Take 1 tablet (50 mg total) by mouth 2 (two) times a day   • metoprolol succinate (TOPROL-XL) 25 mg 24 hr tablet Take 1 tablet (25 mg total) by mouth daily   • Multiple Vitamins-Minerals (PRESERVISION AREDS 2+MULTI VIT PO) Take by mouth • Multiple Vitamins-Minerals (PRESERVISION AREDS 2+MULTI VIT PO) Take 1 capsule by mouth daily     Allergies   Allergen Reactions   • Sulfa Antibiotics Anaphylaxis     Immunization History   Administered Date(s) Administered   • COVID-19 MODERNA VACC 0 5 ML IM 03/24/2021, 04/23/2021, 12/22/2021   • INFLUENZA 11/28/2017   • Influenza Split High Dose Preservative Free IM 11/28/2017   • Pneumococcal Conjugate 13-Valent 08/04/2015   • Pneumococcal Polysaccharide PPV23 07/19/2013       Objective     /72   Pulse 73   Temp 97 7 °F (36 5 °C) (Temporal)   Resp 16   Ht 5' 4 17" (1 63 m)   Wt 81 6 kg (180 lb)   SpO2 97%   BMI 30 73 kg/m²     Physical Exam  Cardiovascular:      Rate and Rhythm: Normal rate and regular rhythm  Heart sounds: Normal heart sounds  Comments: Carotids: no bruits  Ext: no edema  Pulmonary:      Effort: Pulmonary effort is normal  No respiratory distress  Breath sounds: No wheezing or rales  Psychiatric:         Behavior: Behavior normal          Thought Content:  Thought content normal        Norma Payne DO

## 2023-01-28 ENCOUNTER — OFFICE VISIT (OUTPATIENT)
Age: 83
End: 2023-01-28

## 2023-01-28 ENCOUNTER — APPOINTMENT (OUTPATIENT)
Age: 83
End: 2023-01-28

## 2023-01-28 VITALS
HEART RATE: 68 BPM | RESPIRATION RATE: 18 BRPM | BODY MASS INDEX: 30.73 KG/M2 | TEMPERATURE: 98.1 F | DIASTOLIC BLOOD PRESSURE: 70 MMHG | HEIGHT: 64 IN | SYSTOLIC BLOOD PRESSURE: 130 MMHG | WEIGHT: 180 LBS | OXYGEN SATURATION: 97 %

## 2023-01-28 DIAGNOSIS — S89.92XA INJURY OF LEFT KNEE, INITIAL ENCOUNTER: ICD-10-CM

## 2023-01-28 DIAGNOSIS — S89.92XA INJURY OF LEFT KNEE, INITIAL ENCOUNTER: Primary | ICD-10-CM

## 2023-01-28 NOTE — PROGRESS NOTES
[facility]        NAME: Deedee Simmons is a [de-identified] y o  female  : 1940    MRN: [id number]  DATE: 2023  TIME: 3:19 PM    Assessment and Plan   Injury of left knee, initial encounter [S89 [id number]]  1  Injury of left knee, initial encounter  XR knee 4+ vw left injury    Ambulatory Referral to Physical Therapy    Ambulatory Referral to Orthopedic Surgery            Patient Instructions     Patient Instructions     Knee Pain   WHAT YOU NEED TO KNOW:   Knee pain may start suddenly, or it may be a long-term problem  You may have pain on the side, front, or back of your knee  You may have knee stiffness and swelling  You may hear popping sounds or feel like your knee is giving way or locking up as you walk  You may feel pain when you sit, stand, walk, or climb up and down stairs  Knee pain can be caused by conditions such as obesity, inflammation, or strains or tears in ligaments or tendons  DISCHARGE INSTRUCTIONS:   Return to the emergency department if:   · Your pain is worse, even after treatment  · You cannot bend or straighten your leg completely  · The swelling around your knee does not go down even with treatment  · Your knee is painful and hot to the touch  Contact your healthcare provider if:   · You have questions or concerns about your condition or care  Medicines: You may need any of the following:  · NSAIDs  help decrease swelling and pain or fever  This medicine is available with or without a doctor's order  NSAIDs can cause stomach bleeding or kidney problems in certain people  If you take blood thinner medicine, always ask your healthcare provider if NSAIDs are safe for you  Always read the medicine label and follow directions  · Acetaminophen  decreases pain and fever  It is available without a doctor's order  Ask how much to take and how often to take it  Follow directions   Read the labels of all other medicines you are using to see if they also contain acetaminophen, or ask your doctor or pharmacist  Acetaminophen can cause liver damage if not taken correctly  Do not use more than 4 grams (4,000 milligrams) total of acetaminophen in one day  · Prescription pain medicine  may be given  Ask your healthcare provider how to take this medicine safely  Some prescription pain medicines contain acetaminophen  Do not take other medicines that contain acetaminophen without talking to your healthcare provider  Too much acetaminophen may cause liver damage  Prescription pain medicine may cause constipation  Ask your healthcare provider how to prevent or treat constipation  · Take your medicine as directed  Contact your healthcare provider if you think your medicine is not helping or if you have side effects  Tell him or her if you are allergic to any medicine  Keep a list of the medicines, vitamins, and herbs you take  Include the amounts, and when and why you take them  Bring the list or the pill bottles to follow-up visits  Carry your medicine list with you in case of an emergency  What you can do to manage your symptoms:   · Rest your knee so it can heal   Limit activities that increase your pain  Do low-impact exercises, such as walking or swimming  · Apply ice to help reduce swelling and pain  Use an ice pack, or put crushed ice in a plastic bag  Cover it with a towel before you apply it to your knee  Apply ice for 15 to 20 minutes every hour, or as directed  · Apply compression to help reduce swelling  Use a brace or bandage only as directed  · Elevate your knee to help decrease pain and swelling  Elevate your knee while you are sitting or lying down  Prop your leg on pillows to keep your knee above the level of your heart  · Prevent your knee from moving as directed  Your healthcare provider may put on a cast or splint  You may need to wear a leg brace to stabilize your knee   A leg brace can be adjusted to increase your range of motion as your knee heals  What you can do to prevent knee pain:   · Maintain a healthy weight  Extra weight increases your risk for knee pain  Ask your healthcare provider how much you should weigh  He or she can help you create a safe weight loss plan if you need to lose weight  · Exercise or train properly  Use the correct equipment for sports  Wear shoes that provide good support  Check your posture often as you exercise, play sports, or train for an event  This can help prevent stress and strain on your knees  Rest between sessions so you do not overwork your knees  Follow up with your healthcare provider within 24 hours or as directed: You may need follow-up treatments, such as steroid injections to decrease pain  Write down your questions so you remember to ask them during your visits  © Copyright [address] 2022 Information is for End User's use only and may not be sold, redistributed or otherwise used for commercial purposes  All illustrations and images included in CareNotes® are the copyrighted property of A D A M , Inc  or [facility]  The above information is an  only  It is not intended as medical advice for individual conditions or treatments  Talk to your doctor, nurse or pharmacist before following any medical regimen to see if it is safe and effective for you  Follow up with PCP in 3-5 days  Proceed to  ER if symptoms worsen  Chief Complaint     Chief Complaint   Patient presents with   • Knee Pain     C/o left knee pain after a fall two weeks ago  History of Present Illness       Patient complains of pain left anterior knee since fall on same 2 weeks ago  Long history of arthritis of knees with total knee replacement on the right but no knee surgery yet on the left  Review of Systems   Review of Systems   Constitutional: Negative for activity change  Gastrointestinal: Negative for abdominal pain     Musculoskeletal: Positive for arthralgias and joint swelling  Negative for back pain, myalgias, neck pain and neck stiffness  Skin: Negative for color change and wound  Neurological: Negative for dizziness, syncope, weakness, light-headedness and headaches           Current Medications       Current Outpatient Medications:   •  Cholecalciferol (VITAMIN D3) 5000 units CAPS, Take 1 capsule by mouth daily  , Disp: , Rfl:   •  ezetimibe-simvastatin (VYTORIN) 10-20 mg per tablet, Take 1 tablet by mouth daily, Disp: 90 tablet, Rfl: 1  •  furosemide (LASIX) 20 mg tablet, Take 1 tablet (20 mg total) by mouth daily (Patient taking differently: Take 20 mg by mouth daily TAKEN AS NEEDED), Disp: 90 tablet, Rfl: 1  •  hydrochlorothiazide (MICROZIDE) 12 5 mg capsule, Take 1 capsule (12 5 mg total) by mouth daily, Disp: 90 capsule, Rfl: 1  •  losartan (COZAAR) 50 mg tablet, Take 1 tablet (50 mg total) by mouth 2 (two) times a day, Disp: 180 tablet, Rfl: 0  •  metoprolol succinate (TOPROL-XL) 25 mg 24 hr tablet, Take 1 tablet (25 mg total) by mouth daily, Disp: 90 tablet, Rfl: 1  •  Multiple Vitamins-Minerals (PRESERVISION AREDS 2+MULTI VIT PO), Take by mouth, Disp: , Rfl:   •  Multiple Vitamins-Minerals (PRESERVISION AREDS 2+MULTI VIT PO), Take 1 capsule by mouth daily, Disp: , Rfl:     Current Allergies     Allergies as of 01/28/2023 - Reviewed 01/28/2023   Allergen Reaction Noted   • Sulfa antibiotics Anaphylaxis 07/19/2013            The following portions of the patient's history were reviewed and updated as appropriate: allergies, current medications, past family history, past medical history, past social history, past surgical history and problem list      Past Medical History:   Diagnosis Date   • Accelerated essential hypertension     last assessed: 4/27/15   • Cataract    • Fatigue     last assessed: 1/21/14   • Hyperlipidemia    • Hypertension    • Obesity        Past Surgical History:   Procedure Laterality Date   • EYE SURGERY     • FOOT SURGERY Family History   Problem Relation Age of Onset   • Coronary artery disease Father    • Arthritis Father    • Diabetes Father    • Cancer Father    • Arthritis Other    • Diabetes Other          Medications have been verified  Objective   /70 (BP Location: Right arm, Patient Position: Sitting)   Pulse 68   Temp 98 1 °F (36 7 °C)   Resp 18   Ht 5' 4 17" (1 63 m)   Wt 81 6 kg (180 lb)   SpO2 97%   BMI 30 73 kg/m²        Physical Exam     Physical Exam  Vitals and nursing note reviewed  Constitutional:       Appearance: She is well-developed  HENT:      Head: Normocephalic and atraumatic  Eyes:      Conjunctiva/sclera: Conjunctivae normal       Pupils: Pupils are equal, round, and reactive to light  Musculoskeletal:         General: Swelling and tenderness present  Cervical back: Normal range of motion and neck supple  Comments: Mild effusion left knee, mild tenderness at medial joint line  Skin:     General: Skin is warm and dry  Findings: No rash  Neurological:      Mental Status: She is alert and oriented to person, place, and time  Deep Tendon Reflexes: Reflexes normal    Psychiatric:         Behavior: Behavior normal          Thought Content:  Thought content normal          Judgment: Judgment normal

## 2023-01-28 NOTE — PATIENT INSTRUCTIONS
Knee Pain   WHAT YOU NEED TO KNOW:   Knee pain may start suddenly, or it may be a long-term problem  You may have pain on the side, front, or back of your knee  You may have knee stiffness and swelling  You may hear popping sounds or feel like your knee is giving way or locking up as you walk  You may feel pain when you sit, stand, walk, or climb up and down stairs  Knee pain can be caused by conditions such as obesity, inflammation, or strains or tears in ligaments or tendons  DISCHARGE INSTRUCTIONS:   Return to the emergency department if:   Your pain is worse, even after treatment  You cannot bend or straighten your leg completely  The swelling around your knee does not go down even with treatment  Your knee is painful and hot to the touch  Contact your healthcare provider if:   You have questions or concerns about your condition or care  Medicines: You may need any of the following:  NSAIDs  help decrease swelling and pain or fever  This medicine is available with or without a doctor's order  NSAIDs can cause stomach bleeding or kidney problems in certain people  If you take blood thinner medicine, always ask your healthcare provider if NSAIDs are safe for you  Always read the medicine label and follow directions  Acetaminophen  decreases pain and fever  It is available without a doctor's order  Ask how much to take and how often to take it  Follow directions  Read the labels of all other medicines you are using to see if they also contain acetaminophen, or ask your doctor or pharmacist  Acetaminophen can cause liver damage if not taken correctly  Do not use more than 4 grams (4,000 milligrams) total of acetaminophen in one day  Prescription pain medicine  may be given  Ask your healthcare provider how to take this medicine safely  Some prescription pain medicines contain acetaminophen   Do not take other medicines that contain acetaminophen without talking to your healthcare provider  Too much acetaminophen may cause liver damage  Prescription pain medicine may cause constipation  Ask your healthcare provider how to prevent or treat constipation  Take your medicine as directed  Contact your healthcare provider if you think your medicine is not helping or if you have side effects  Tell him or her if you are allergic to any medicine  Keep a list of the medicines, vitamins, and herbs you take  Include the amounts, and when and why you take them  Bring the list or the pill bottles to follow-up visits  Carry your medicine list with you in case of an emergency  What you can do to manage your symptoms:   Rest your knee so it can heal   Limit activities that increase your pain  Do low-impact exercises, such as walking or swimming  Apply ice to help reduce swelling and pain  Use an ice pack, or put crushed ice in a plastic bag  Cover it with a towel before you apply it to your knee  Apply ice for 15 to 20 minutes every hour, or as directed  Apply compression to help reduce swelling  Use a brace or bandage only as directed  Elevate your knee to help decrease pain and swelling  Elevate your knee while you are sitting or lying down  Prop your leg on pillows to keep your knee above the level of your heart  Prevent your knee from moving as directed  Your healthcare provider may put on a cast or splint  You may need to wear a leg brace to stabilize your knee  A leg brace can be adjusted to increase your range of motion as your knee heals  What you can do to prevent knee pain:   Maintain a healthy weight  Extra weight increases your risk for knee pain  Ask your healthcare provider how much you should weigh  He or she can help you create a safe weight loss plan if you need to lose weight  Exercise or train properly  Use the correct equipment for sports  Wear shoes that provide good support  Check your posture often as you exercise, play sports, or train for an event  This can help prevent stress and strain on your knees  Rest between sessions so you do not overwork your knees  Follow up with your healthcare provider within 24 hours or as directed: You may need follow-up treatments, such as steroid injections to decrease pain  Write down your questions so you remember to ask them during your visits  © Copyright [employer] 2022 Information is for End User's use only and may not be sold, redistributed or otherwise used for commercial purposes  All illustrations and images included in CareNotes® are the copyrighted property of [employer]  or [facility] Sonny Gomez   The above information is an  only  It is not intended as medical advice for individual conditions or treatments  Talk to your doctor, nurse or pharmacist before following any medical regimen to see if it is safe and effective for you

## 2023-02-07 ENCOUNTER — OFFICE VISIT (OUTPATIENT)
Dept: OBGYN CLINIC | Facility: MEDICAL CENTER | Age: 83
End: 2023-02-07

## 2023-02-07 VITALS
WEIGHT: 186 LBS | SYSTOLIC BLOOD PRESSURE: 176 MMHG | DIASTOLIC BLOOD PRESSURE: 79 MMHG | HEART RATE: 74 BPM | HEIGHT: 64 IN | BODY MASS INDEX: 31.76 KG/M2

## 2023-02-07 DIAGNOSIS — M17.12 PRIMARY OSTEOARTHRITIS OF LEFT KNEE: Primary | ICD-10-CM

## 2023-02-07 DIAGNOSIS — S89.92XA INJURY OF LEFT KNEE, INITIAL ENCOUNTER: ICD-10-CM

## 2023-02-07 DIAGNOSIS — M25.562 ACUTE PAIN OF LEFT KNEE: ICD-10-CM

## 2023-02-07 RX ORDER — METHYLPREDNISOLONE ACETATE 40 MG/ML
1 INJECTION, SUSPENSION INTRA-ARTICULAR; INTRALESIONAL; INTRAMUSCULAR; SOFT TISSUE
Status: COMPLETED | OUTPATIENT
Start: 2023-02-07 | End: 2023-02-07

## 2023-02-07 RX ORDER — BUPIVACAINE HYDROCHLORIDE 2.5 MG/ML
4 INJECTION, SOLUTION INFILTRATION; PERINEURAL
Status: COMPLETED | OUTPATIENT
Start: 2023-02-07 | End: 2023-02-07

## 2023-02-07 RX ADMIN — METHYLPREDNISOLONE ACETATE 1 ML: 40 INJECTION, SUSPENSION INTRA-ARTICULAR; INTRALESIONAL; INTRAMUSCULAR; SOFT TISSUE at 15:06

## 2023-02-07 RX ADMIN — BUPIVACAINE HYDROCHLORIDE 4 ML: 2.5 INJECTION, SOLUTION INFILTRATION; PERINEURAL at 15:06

## 2023-02-07 NOTE — PROGRESS NOTES
Knee New Office Note    Assessment:     1  Acute pain of left knee    2  Injury of left knee, initial encounter        Plan:     Problem List Items Addressed This Visit    None  Visit Diagnoses     Injury of left knee, initial encounter              [de-identified] y/o patient with left knee pain, xr from urgent care were reviewed and discussed during patients visit today  She does have mild arthritic changes and mild chondrocalcinosis on XR  She is experiencing a flare up of symptoms, we discussed non-surgical treatments and patient elected to move forward with a left knee CSI, this was prepared sterilely and tolerated well  Encouraged patient to keep up activity as tolerated and use ice prn  Patient can follow up prn and was reminded she can repeat injections every 3-4 months prn  Subjective:     Patient ID: Rebecca Ruth is a [de-identified] y o  female  Chief Complaint: Left knee pain  HPI: Patient is a [de-identified] y/o female who presents today for left knee pain  She states that in early January 2023, she fell landing on her bilateral knee and bilateral arms  She recalls having left knee pain following the fall which did subside some  A few days later, she was stretching down to reach for an item and ended up in a split position this increased her left knee pain  She has since been ambulating with cane  She describes her pain as being medial and does have a clicking in her knee  She was seen in urgent care on 01/28/23, she had XR done showing arthritic changes and no fractures  She continues to have left knee pain and has not had any formal treatments yet       History of right TKA done by [facility]       Allergy:  Allergies   Allergen Reactions   • Sulfa Antibiotics Anaphylaxis     Medications:  all current active meds have been reviewed  Past Medical History:  Past Medical History:   Diagnosis Date   • Accelerated essential hypertension     last assessed: 4/27/15   • Cataract    • Fatigue     last assessed: 1/21/14   • Hyperlipidemia    • Hypertension    • Obesity      Past Surgical History:  Past Surgical History:   Procedure Laterality Date   • EYE SURGERY     • FOOT SURGERY       Family History:  Family History   Problem Relation Age of Onset   • Coronary artery disease Father    • Arthritis Father    • Diabetes Father    • Cancer Father    • Arthritis Other    • Diabetes Other      Social History:  Social History     Substance and Sexual Activity   Alcohol Use Yes    Comment: occasional     Social History     Substance and Sexual Activity   Drug Use No     Social History     Tobacco Use   Smoking Status Former   • Packs/day: 1 00   • Types: Cigarettes   • Quit date:    • Years since quittin 1   Smokeless Tobacco Never           ROS:  General: Per HPI  Skin: Negative, except if noted below  HEENT: Negative  Respiratory: Negative  Cardiovascular: Negative  Gastrointestinal: Negative  Urinary: Negative  Vascular: Negative  Musculoskeletal: Positive per HPI   Neurologic: Positive per HPI  Endocrine: Negative    Objective:  BP Readings from Last 1 Encounters:   23 (!) 176/79      Wt Readings from Last 1 Encounters:   23 84 4 kg (186 lb)        Respiratory:   non-labored respirations    Lymphatics:  no palpable lymph nodes    Gait:   analgetic     Neurologic:   Alert and oriented times x2  Patient with normal sensation except as noted below  Deep tendon reflexes 2+ except as noted in MSK exam    Bilateral Lower Extremity:  Left Knee: Inspection: skin intact    Overall limb alignment normal     Effusion: none    ROM 0-120 with no pain    Extensor Lag: none    Palpation: mild medial joint line tenderness to palpation, baker's cyst felt posteriorly    AP Stability at 90 deg stable    M/L stability in full extension stable    M/L stability in midflexion stable    Motor: 5/5 IP/Q/HS/TA/GS    Pulses: 2+ DP / 2+ PT    SILT DP/SP/S/S/TN    Right knee:      Inspection:  skin intact    Overall limb alignment normal    Effusion: none ROM 0-120 w/o pain    Extensor Lag: none    Palpation: no joint line tenderness to palpation    AP Stability at 90 deg stable    M/L stability in full extension stable    M/L stability in midflexion stable    Motor: 5/5 IP/Q/HS/TA/GS    Pulses: 2+ DP / 2+ PT    SILT DP/SP/S/S/TN    Imaging:  My interpretation XR AP scanogram/AP bilateral knee/lateral/reyes/sunrise left knee: mild joint space narrowing, subchondral sclerosis, subchondral cysts, osteophyte formation  Mild chondrocalcinosis  No fracture or dislocation  Large joint arthrocentesis: L knee  Universal Protocol:  Consent given by: patient  Time out: Immediately prior to procedure a "time out" was called to verify the correct patient, procedure, equipment, support staff and site/side marked as required  Site marked: the operative site was marked  Supporting Documentation  Indications: pain and diagnostic evaluation   Procedure Details  Location: knee - L knee  Preparation: Patient was prepped and draped in the usual sterile fashion  Needle size: 22 G  Ultrasound guidance: no  Approach: lateral  Medications administered: 4 mL bupivacaine 0 25 %; 1 mL methylPREDNISolone acetate 40 mg/mL    Patient tolerance: patient tolerated the procedure well with no immediate complications  Dressing:  Sterile dressing applied        BMI:   Estimated body mass index is 31 93 kg/m² as calculated from the following:    Height as of this encounter: 5' 4" (1 626 m)  Weight as of this encounter: 84 4 kg (186 lb)  BSA:   Estimated body surface area is 1 9 meters squared as calculated from the following:    Height as of this encounter: 5' 4" (1 626 m)  Weight as of this encounter: 84 4 kg (186 lb)             Scribe Attestation    I,:  Nima Ortega am acting as a scribe while in the presence of the attending physician :       I,:  Yu Pradhan, DO personally performed the services described in this documentation    as scribed in my presence :

## 2023-06-07 DIAGNOSIS — I10 BENIGN ESSENTIAL HYPERTENSION: ICD-10-CM

## 2023-06-07 DIAGNOSIS — E78.5 HYPERLIPIDEMIA, UNSPECIFIED HYPERLIPIDEMIA TYPE: ICD-10-CM

## 2023-06-07 NOTE — TELEPHONE ENCOUNTER
Pt stopped in with a list of medications that need to be refilled   This request was put back to clinical

## 2023-06-08 RX ORDER — LOSARTAN POTASSIUM 50 MG/1
50 TABLET ORAL 2 TIMES DAILY
Qty: 180 TABLET | Refills: 0 | Status: SHIPPED | OUTPATIENT
Start: 2023-06-08

## 2023-06-08 RX ORDER — EZETIMIBE AND SIMVASTATIN 10; 20 MG/1; MG/1
1 TABLET ORAL DAILY
Qty: 90 TABLET | Refills: 1 | Status: SHIPPED | OUTPATIENT
Start: 2023-06-08

## 2023-06-08 RX ORDER — HYDROCHLOROTHIAZIDE 12.5 MG/1
12.5 CAPSULE, GELATIN COATED ORAL DAILY
Qty: 90 CAPSULE | Refills: 1 | Status: SHIPPED | OUTPATIENT
Start: 2023-06-08

## 2023-06-08 RX ORDER — METOPROLOL SUCCINATE 25 MG/1
25 TABLET, EXTENDED RELEASE ORAL DAILY
Qty: 90 TABLET | Refills: 1 | Status: SHIPPED | OUTPATIENT
Start: 2023-06-08

## 2023-07-11 ENCOUNTER — OFFICE VISIT (OUTPATIENT)
Dept: OBGYN CLINIC | Facility: MEDICAL CENTER | Age: 83
End: 2023-07-11
Payer: COMMERCIAL

## 2023-07-11 VITALS
HEIGHT: 64 IN | SYSTOLIC BLOOD PRESSURE: 145 MMHG | WEIGHT: 184.2 LBS | DIASTOLIC BLOOD PRESSURE: 84 MMHG | HEART RATE: 79 BPM | BODY MASS INDEX: 31.45 KG/M2

## 2023-07-11 DIAGNOSIS — M25.562 ACUTE PAIN OF LEFT KNEE: ICD-10-CM

## 2023-07-11 DIAGNOSIS — M71.22 POPLITEAL CYST, LEFT: Primary | ICD-10-CM

## 2023-07-11 PROCEDURE — 99214 OFFICE O/P EST MOD 30 MIN: CPT | Performed by: STUDENT IN AN ORGANIZED HEALTH CARE EDUCATION/TRAINING PROGRAM

## 2023-07-11 NOTE — PROGRESS NOTES
Knee Follow up Office Note    Assessment:     1. Popliteal cyst, left    2. Acute pain of left knee        Plan:     Problem List Items Addressed This Visit    None  Visit Diagnoses     Injury of left knee, initial encounter              83 y/o patient with left knee pain secondary to a large popliteal cyst.  She had a previous cortisone injection with no significant improvement in her symptoms. On physical exam today she does have a large palpable Baker's cyst which causes her pain. Discussed that treatment of choice is an aspiration of the Baker's cyst under ultrasound guidance. We will place an order for Dr. Albertina Edwards to do ultrasound guided aspiration of her left knee Baker's cyst.  Follow-up as needed. Subjective:     Patient ID: Jose Mercer is a 82 y.o. female. Chief Complaint: Left knee pain    HPI: Patient is an 83 y/o female who presents today for left knee pain. She states that in early January 2023, she fell landing on her bilateral knees. She had an increased amount of left knee pain after her injury. She was given a cortisone injection into the left knee in February with no improvement in her symptoms. She has been trying to rest the knee which she feels does help her symptoms. Today she states that her pain is localized to the lateral aspect of the knee.   She does have a known Baker's cyst.    History of right TKA done by [facility]       Allergy:  Allergies   Allergen Reactions   • Sulfa Antibiotics Anaphylaxis     Medications:  all current active meds have been reviewed  Past Medical History:  Past Medical History:   Diagnosis Date   • Accelerated essential hypertension     last assessed: 4/27/15   • Cataract    • Fatigue     last assessed: 1/21/14   • Hyperlipidemia    • Hypertension    • Obesity      Past Surgical History:  Past Surgical History:   Procedure Laterality Date   • EYE SURGERY     • FOOT SURGERY       Family History:  Family History   Problem Relation Age of Onset   • Coronary artery disease Father    • Arthritis Father    • Diabetes Father    • Cancer Father    • Arthritis Other    • Diabetes Other      Social History:  Social History     Substance and Sexual Activity   Alcohol Use Yes    Comment: occasional     Social History     Substance and Sexual Activity   Drug Use No     Social History     Tobacco Use   Smoking Status Former   • Packs/day: 1.00   • Types: Cigarettes   • Quit date:    • Years since quittin.5   Smokeless Tobacco Never           ROS:  General: Per HPI  Skin: Negative, except if noted below  HEENT: Negative  Respiratory: Negative  Cardiovascular: Negative  Gastrointestinal: Negative  Urinary: Negative  Vascular: Negative  Musculoskeletal: Positive per HPI   Neurologic: Positive per HPI  Endocrine: Negative    Objective:  BP Readings from Last 1 Encounters:   23 145/84      Wt Readings from Last 1 Encounters:   23 83.6 kg (184 lb 3.2 oz)        Respiratory:   non-labored respirations    Lymphatics:  no palpable lymph nodes    Gait:   analgic     Neurologic:   Alert and oriented times x2  Patient with normal sensation except as noted below  Deep tendon reflexes 2+ except as noted in MSK exam    Bilateral Lower Extremity:  Left Knee: Inspection: skin intact    Overall limb alignment normal     Effusion: none    ROM 0-120 with no pain    Extensor Lag: none    Palpation: mild lateral joint line tenderness to palpation, large baker's cyst felt posteriorly    AP Stability at 90 deg stable    M/L stability in full extension stable    M/L stability in midflexion stable    Motor: 5/5 IP/Q/HS/TA/GS    Pulses: 2+ DP / 2+ PT    SILT DP/SP/S/S/TN      Imaging:  No new imaging today      BMI:   Estimated body mass index is 31.62 kg/m² as calculated from the following:    Height as of this encounter: 5' 4" (1.626 m). Weight as of this encounter: 83.6 kg (184 lb 3.2 oz).   BSA:   Estimated body surface area is 1.89 meters squared as calculated from the following:    Height as of this encounter: 5' 4" (1.626 m). Weight as of this encounter: 83.6 kg (184 lb 3.2 oz).            Scribe Attestation    I,:  Adria Montalvo PA-C am acting as a scribe while in the presence of the attending physician.:       I,:  Syeda Roads, DO personally performed the services described in this documentation    as scribed in my presence.:

## 2023-07-17 ENCOUNTER — OFFICE VISIT (OUTPATIENT)
Age: 83
End: 2023-07-17
Payer: COMMERCIAL

## 2023-07-17 VITALS
DIASTOLIC BLOOD PRESSURE: 74 MMHG | HEIGHT: 64 IN | WEIGHT: 186 LBS | SYSTOLIC BLOOD PRESSURE: 168 MMHG | BODY MASS INDEX: 31.76 KG/M2 | HEART RATE: 74 BPM

## 2023-07-17 DIAGNOSIS — S89.92XD INJURY OF LEFT KNEE, SUBSEQUENT ENCOUNTER: Primary | ICD-10-CM

## 2023-07-17 DIAGNOSIS — M71.22 BAKER'S CYST, LEFT: ICD-10-CM

## 2023-07-17 DIAGNOSIS — M25.562 CHRONIC PAIN OF LEFT KNEE: ICD-10-CM

## 2023-07-17 DIAGNOSIS — M17.12 PRIMARY OSTEOARTHRITIS OF LEFT KNEE: ICD-10-CM

## 2023-07-17 DIAGNOSIS — R26.9 GAIT DIFFICULTY: ICD-10-CM

## 2023-07-17 DIAGNOSIS — G89.29 CHRONIC PAIN OF LEFT KNEE: ICD-10-CM

## 2023-07-17 PROBLEM — S89.92XA INJURY OF LEFT KNEE: Status: ACTIVE | Noted: 2023-07-17

## 2023-07-17 PROCEDURE — 99214 OFFICE O/P EST MOD 30 MIN: CPT | Performed by: STUDENT IN AN ORGANIZED HEALTH CARE EDUCATION/TRAINING PROGRAM

## 2023-07-17 NOTE — PROGRESS NOTES
1. Injury of left knee, subsequent encounter  MRI knee left  wo contrast    Brace    Ambulatory Referral to Orthopedic Surgery      2. Chronic pain of left knee  MRI knee left  wo contrast    Brace    Ambulatory Referral to Orthopedic Surgery      3. Primary osteoarthritis of left knee  MRI knee left  wo contrast    Brace    Ambulatory Referral to Orthopedic Surgery      4. Baker's cyst, left  MRI knee left  wo contrast    Brace    Ambulatory Referral to Orthopedic Surgery      5. Gait difficulty  Brace    Ambulatory Referral to Orthopedic Surgery        Orders Placed This Encounter   Procedures   • Brace   • MRI knee left  wo contrast   • Ambulatory Referral to Orthopedic Surgery        Imaging Studies (I personally reviewed images in PACS and report):    • X-ray left knee 1/20/2023: No acute osseous abnormalities. No joint effusion. Mild tricompartmental osteoarthritic changes evidenced by joint space narrowing, marginal osteophytes. Atherosclerotic calcifications. IMPRESSION:  • Chronic left knee pain  • Precipitating injury from a fall in January 2023 as well as a subsequent twisting injury few days later  • Primary osteoarthritis of the left knee (differential includes degenerative meniscal tear/flare)  • Referred here today for Baker's cyst aspiration      Other factors:  • BMI 31.6  • History of right TKA    PLAN:    • Clinical exam and radiographic imaging reviewed with patient today, with impression as per above. I have discussed with the patient the pathophysiology of this diagnosis and reviewed how the examination correlates with this diagnosis. • Prior imaging reviewed with patient today as noted above. • Offered treatment options including USG aspiration of her Baker's cyst today.   I discussed the risk/benefits including bleeding, nerve damage, infection and patient prefers to defer this option in favor of further evaluation through an MRI without contrast.  Especially since she states she had no relief from the intra-articular cortisone injection from last visit. I did encourage formal physical therapy as well which she declined until she knew what MRI had reported. Counseled her insurance may require her to attend physical therapy to obtain an MRI and she still prefers to proceed with further imaging. Order placed today. • Furthermore, in regards to her gait dysfunction/reported sense of instability I have prescribed her hinged knee brace to be used during activities as needed along with her cane. • Given there is some clinical suspicion for meniscal injury along with her mild osteoarthritis seen on imaging, I will have her follow-up with orthopedic surgery after the MRI to determine treatment options going forward. She states that this office is more conveniently located for her and I will have her follow-up with Dr. Caroline Carrasquillo from orthopedic surgery to discuss findings of MRI. Return for Follow up with Dr. Caroline Carrasquillo after MRI of left knee. Portions of the record may have been created with voice recognition software. Occasional wrong word or "sound a like" substitutions may have occurred due to the inherent limitations of voice recognition software. Read the chart carefully and recognize, using context, where substitutions have occurred. CHIEF COMPLAINT:  Chief Complaint   Patient presents with   • Left Knee - Pain         HPI:  Martha Boothe is a 82 y.o. female  who presents for       Visit 7/17/2023:  Initial evaluation of left knee pain/swelling:  Internally referred from Dr. González Kothari from orthopedics for potential ultrasound-guided Baker's cyst aspiration    Of note she has a history of primary osteoarthritis of her left knee. She has a history of a TKA of her right knee. She had a fall in early January 2023 after falling onto her bilateral hands and knees.   She states that her left knee did calm down after a few days but she subsequently had another injury in which she went to reach for something and ended up twisting her knee and being in a split position further aggravating her knee pain and swelling. Since this incident she has been ambulating with use of a cane for mobility due to continued pain/giving out/stiffness. She had seen orthopedic surgery in which she underwent an intra-articular cortisone injection in 2023. She states having no relief from this injection in regards to pain, swelling, mobility. She states she continues to mostly have pain over the lateral and medial aspects of her knee. She states the swelling behind her knee changes in size daily. Sometimes is more pronounced but today has mostly been receding and feels like a "egg.". She feels her range of motion is mostly intact but can be aggravated with flexion over the lateral joint line aspect of her knee. She reports clicking/popping of her knee with range of motion movements which is new since her injury. She denies any discoloration but does report some sense of warmth. Patient is hesitant to undergo an aspiration of her Baker's cyst in her knee in favor of further imaging studies as she is open to surgical intervention if it is warranted.     Medical, Surgical, Family, and Social History    Past Medical History:   Diagnosis Date   • Accelerated essential hypertension     last assessed: 4/27/15   • Cataract    • Fatigue     last assessed: 14   • Hyperlipidemia    • Hypertension    • Obesity      Past Surgical History:   Procedure Laterality Date   • EYE SURGERY     • FOOT SURGERY       Social History   Social History     Substance and Sexual Activity   Alcohol Use Yes    Comment: occasional     Social History     Substance and Sexual Activity   Drug Use No     Social History     Tobacco Use   Smoking Status Former   • Packs/day: 1.00   • Types: Cigarettes   • Quit date:    • Years since quittin.5   Smokeless Tobacco Never     Family History   Problem Relation Age of Onset   • Coronary artery disease Father    • Arthritis Father    • Diabetes Father    • Cancer Father    • Arthritis Other    • Diabetes Other      Allergies   Allergen Reactions   • Sulfa Antibiotics Anaphylaxis          Physical Exam  /74   Pulse 74   Ht 5' 4" (1.626 m)   Wt 84.4 kg (186 lb)   BMI 31.93 kg/m²     Constitutional:  see vital signs  Gen: obese, normocephalic/atraumatic, well-groomed  Eyes: No inflammation or discharge of conjunctiva or lids; sclera clear   Pulmonary/Chest: Effort normal. No respiratory distress. Ortho Exam  Left Knee Exam:  Erythema: no  Swelling: no (localized posteromedial full/swelling over baker's cyst)  Increased Warmth: no  Tenderness: +LJL, MJL  ROM: 0-100 (further flexion aggravated anterior knee pain )  Knee flexion strength: 5-/5  Knee extension strength: 5/5  Patellar Apprehension: negative  Patellar Grind: negative  Anterior Drawer: negative  Varus laxity: negative, but aggravates knee pain  Valgus laxity: negative, but aggravates knee pain    No calf tenderness to palpation     Gait: Antalgic with use of single-point cane.       Procedures

## 2023-07-23 ENCOUNTER — HOSPITAL ENCOUNTER (OUTPATIENT)
Dept: MRI IMAGING | Facility: HOSPITAL | Age: 83
Discharge: HOME | End: 2023-07-23
Payer: COMMERCIAL

## 2023-07-23 DIAGNOSIS — M25.562 CHRONIC PAIN OF LEFT KNEE: ICD-10-CM

## 2023-07-23 DIAGNOSIS — M71.22 BAKER'S CYST, LEFT: ICD-10-CM

## 2023-07-23 DIAGNOSIS — S89.92XD INJURY OF LEFT KNEE, SUBSEQUENT ENCOUNTER: ICD-10-CM

## 2023-07-23 DIAGNOSIS — G89.29 CHRONIC PAIN OF LEFT KNEE: ICD-10-CM

## 2023-07-23 DIAGNOSIS — M17.12 PRIMARY OSTEOARTHRITIS OF LEFT KNEE: ICD-10-CM

## 2023-07-23 PROCEDURE — 73721 MRI JNT OF LWR EXTRE W/O DYE: CPT

## 2023-07-23 PROCEDURE — G1004 CDSM NDSC: HCPCS

## 2023-08-01 ENCOUNTER — OFFICE VISIT (OUTPATIENT)
Age: 83
End: 2023-08-01
Payer: COMMERCIAL

## 2023-08-01 VITALS
HEIGHT: 64 IN | HEART RATE: 66 BPM | DIASTOLIC BLOOD PRESSURE: 74 MMHG | SYSTOLIC BLOOD PRESSURE: 166 MMHG | BODY MASS INDEX: 32.44 KG/M2 | WEIGHT: 190 LBS

## 2023-08-01 DIAGNOSIS — R26.9 GAIT DIFFICULTY: ICD-10-CM

## 2023-08-01 DIAGNOSIS — S89.92XD INJURY OF LEFT KNEE, SUBSEQUENT ENCOUNTER: ICD-10-CM

## 2023-08-01 DIAGNOSIS — M17.12 PRIMARY OSTEOARTHRITIS OF LEFT KNEE: ICD-10-CM

## 2023-08-01 DIAGNOSIS — M71.22 BAKER'S CYST, LEFT: ICD-10-CM

## 2023-08-01 DIAGNOSIS — G89.29 CHRONIC PAIN OF LEFT KNEE: ICD-10-CM

## 2023-08-01 DIAGNOSIS — M25.562 CHRONIC PAIN OF LEFT KNEE: ICD-10-CM

## 2023-08-01 PROCEDURE — 99213 OFFICE O/P EST LOW 20 MIN: CPT | Performed by: ORTHOPAEDIC SURGERY

## 2023-08-01 NOTE — PROGRESS NOTES
PT Evaluation     Today's date: 2023  Patient name: Gordo Domingo  : 1940  MRN: [id number]  Referring provider: Rasta Kirby, *  Dx:   Encounter Diagnosis     ICD-10-CM    1. Primary osteoarthritis of left knee  M17.12 Ambulatory Referral to Physical Therapy          Start Time: 830  Stop Time: 930  Total time in clinic (min): 60 minutes    Assessment  Assessment details: 81 yo female referred to PT with dx of  L knee OA. Pt presents with 0-3/10 pain scale-with pain mostly with L inf lat pat (ant) to palp. Physical exam reveals B hip and L knee weakness (grossly 4-/5) and hip flexor tightness and dec L knee flex. Pt demonstrates and/or c/o difficulty with prolonged standing/walking including stair amb and sit to stand transfers. Pt would benefit from PT at this time to develop and establish an exercise program addressing her B trunk/hip/LE weakness as well as LE flexibility. As well, pt would benefit greatly from joint conservation and body mechanics education in order to preserve joint integrity and better handle to stresses of OA in her  L knee while allowing her to return to her PLOF, including Household tasks  and ADLs/self care  at an intensity and duration that enables a safer and more competent existence at home and in the community safely with less pain and limitation  .    Impairments: abnormal gait, abnormal or restricted ROM, activity intolerance, impaired balance, impaired physical strength, lacks appropriate home exercise program, pain with function, poor posture  and poor body mechanics    Symptom irritability: moderate  Goals  Pt will achieve the following goals in the next 2-4 weeks  STG 23  Indep with current HEP  Dec pain by 1-3 levels  Improve L knee AROM by 2-5 deg  Improve L knee/LE strength by 1/2 grade  Initiate posture, joint conservation and body mechanics education  Consistent PT attendance BIW      Pt will achieve the following goals by d/c (8-12 weeks, or as stated in plan)  LTG  indep and compliant with HEP in order to maximize gains achieved in therapy  0-2/10 pain scale in order to feel better and decrease/eliminate use of  pain &/or anti-inflammatory  meds  L knee/LE ROM WFL and  strength 4-5/5 in order to improve functional mobility   Amb safely level/unlevel x community distances with appropriate AD  Pt will demonstrate normalized gait quality on level surfaces as evidenced by normal heel to toe sequence, improved fluidity and normal step length  Exhibit carryover of proper posture and joint conservation techniques and body mechanics in order to dec unnecessary muscle strain and preserve joint integrity  Improve  FOTO score to 55 or more to indicate inc functional ability of patient   Resume activities,including prolonged sit to stand transfers, walking, standing and stair amb, safely with less pain and limitation at her premorbid  intensity and duration    Plan  Patient would benefit from: skilled physical therapy  Planned modality interventions: cryotherapy and thermotherapy: hydrocollator packs  Planned therapy interventions: manual therapy, joint mobilization, balance, neuromuscular re-education, patient education, strengthening, stretching, therapeutic activities, therapeutic exercise, flexibility, body mechanics training, postural training, gait training and home exercise program  Frequency: 2x week  Duration in visits: 16  Duration in weeks: 8  Plan of Care beginning date: 8/4/2023  Plan of Care expiration date: 9/29/2023  Treatment plan discussed with: patient        Subjective Evaluation    History of Present Illness  Onset date: January 2023. Mechanism of injury: 8/4/23 Eval-pt presented to orthopedics 8/1/23 w/ c/o L knee pain since falling Jan. 2023. MRI/xrays reveal moderate tricompartmental osteoarthritis w/ Baker's cyst. No relief w/ steroid injection. Pt now referred for PT intervention.   (pt w/ h/o R TKR)  B shld pain due to fall as well (braces fall w/ arms)-can't lift OH well-takes Aleve which helps  Pt reports she is walking less de to pain. Using spc outside of home. Standing prolonged bothers  Steps-2 railings/spc step to step pattern-feels unstable  No pain sitting-but after awhile bothers. 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 Gets swelling w/ prolonged standing /walking as well  Some trouble sleeping-does nap during the day  Patient Goals  Patient goals for therapy: decreased pain and increased strength  Patient goal: resume activities w/ less pain and limitation at premorbid intensity and duration  Pain  Current pain ratin  At best pain ratin  At worst pain rating: 3  Location: L knee  Relieving factors: ice  Aggravating factors: standing, walking and stair climbing  Progression: worsening    Social Support  Stairs in house: yes   Lives in: multiple-level home  Lives with: spouse      Diagnostic Tests  MRI studies: abnormal (23:  L knee  1. Mild degenerative change without evidence of full-thickness cartilage loss. 2. Joint effusion and prominent Baker's cyst both exhibiting reactive synovitis.  3. No evidence of meniscal tear or other internal derangement.)  Treatments  Current treatment: physical therapy        Objective     Active Range of Motion   Left Hip   Flexion: 70 (SLR) degrees     Right Hip   Flexion: 70 (SLR) degrees   Left Knee   Flexion: 119 degrees   Extension: -2 degrees   Extensor lag: -2 degrees     Right Knee   Flexion: 121 (KTC) degrees   Extension: 0 degrees   Extensor la degrees     Passive Range of Motion     Additional Passive Range of Motion Details  B Hip flexor tightness    Strength/Myotome Testing     Left Hip   Planes of Motion   Flexion: 4-  Abduction: 4-  External rotation: 4-  Internal rotation: 4    Right Hip   Planes of Motion   Flexion: 4-  Abduction: 4-  External rotation: 5  Internal rotation: 5    Left Knee   Flexion: 4- (pain)  Extension: 4- (pain)    Right Knee   Flexion: 5  Extension: 5    Left Ankle/Foot   Dorsiflexion: 5  Plantar flexion: 5    Right Ankle/Foot   Dorsiflexion: 5  Plantar flexion: 5    Additional Strength Details  Bridging intact unsupported w/ minimal elevation  Deep squat to ~30 deg knee flex supported   Heel raise (on toes) intact w/ moderate elevation supported  Toe raise (on heels) intact w/ minimal elevation supported      Ambulation     Comments   Amb indep with [facility] clinic distances and parking lot to clinic.  Pt with gait deviations noted: forward trunk, no toe off, short step length, dec hip mov't, less fluid      Flowsheet Rows    Flowsheet Row Most Recent Value   PT/OT G-Codes    Current Score 45   Projected Score 55   Assessment Type Evaluation             Precautions: HTN, fatigue, obesity       8/4/23            Manuals #1 assess 5x STS   FOTO     Re-eval   PROM/MOB L knee, patella AE                                                   Neuro Re-Ed                                                                                                        Ther Ex             Nustep seat -            Stand:  -alt hip f, e,ab  -march  -knee flex (butt kicks)  -squats (shallow) -                         Sit to stand x1            Sit LAQ/HS  x10            Supine:  -QS  -SAQ  -Hip add iso x10 ea            Supine:  -SLR  -bridging x10            S/l:  -hip abd                                       Ther Activity                                       Gait Training                                       Modalities

## 2023-08-01 NOTE — PROGRESS NOTES
CHIEF COMPLAINT/REASON FOR VISIT  Chief Complaint   Patient presents with   • Left Knee - Follow-up        HISTORY OF PRESENT ILLNESS  Cong Pyle is a 82 y.o. female who presents for evaluation of his left knee. Patient said that she fell on the left knee in January 2023. She describes the pain currently on the lateral side of her knee. Patient admits to intermittent episodes where the pain resolved but then comes back and will affect her ability to walk due to the pain intensity. Patient reports that she does not have any pain when she is sitting. She does feel like things get better when she rests the knee. She does report occasional clicking in the joint. Of note, she reports history of right knee joint replacement and does not think that the pain is similar in the left knee. REVIEW OF SYSTEMS  Review of systems was performed and, outside that mentioned in the HPI, it was negative for symptomology related to the integumentary, hematologic, immunologic, allergic, neurologic, cardiovascular, respiratory, GI or  systems.     MEDICAL HISTORY  Patient Active Problem List   Diagnosis   • Benign essential hypertension   • Edema   • Hyperlipidemia   • Arthritis of right knee   • Arthritis   • Exudative age-related macular degeneration of left eye, unspecified stage ([address])   • Class 1 obesity due to excess calories without serious comorbidity with body mass index (BMI) of 30.0 to 30.9 in adult   • Gait difficulty   • Injury of left knee   • Primary osteoarthritis of left knee   • Chronic pain of left knee   • Baker's cyst, left       SURGICAL HISTORY  Past Surgical History:   Procedure Laterality Date   • EYE SURGERY     • FOOT SURGERY         CURRENT MEDICATIONS    Current Outpatient Medications:   •  Cholecalciferol (VITAMIN D3) 5000 units CAPS, Take 1 capsule by mouth daily  , Disp: , Rfl:   •  ezetimibe-simvastatin (VYTORIN) 10-20 mg per tablet, Take 1 tablet by mouth daily, Disp: 90 tablet, Rfl: 1  • furosemide (LASIX) 20 mg tablet, Take 1 tablet (20 mg total) by mouth daily (Patient taking differently: Take 20 mg by mouth daily TAKEN AS NEEDED), Disp: 90 tablet, Rfl: 1  •  hydrochlorothiazide (MICROZIDE) 12.5 mg capsule, Take 1 capsule (12.5 mg total) by mouth daily, Disp: 90 capsule, Rfl: 1  •  losartan (COZAAR) 50 mg tablet, Take 1 tablet (50 mg total) by mouth 2 (two) times a day, Disp: 180 tablet, Rfl: 0  •  metoprolol succinate (TOPROL-XL) 25 mg 24 hr tablet, Take 1 tablet (25 mg total) by mouth daily, Disp: 90 tablet, Rfl: 1  •  Multiple Vitamins-Minerals (PRESERVISION AREDS 2+MULTI VIT PO), Take 1 capsule by mouth daily, Disp: , Rfl:   •  Multiple Vitamins-Minerals (PRESERVISION AREDS 2+MULTI VIT PO), Take by mouth, Disp: , Rfl:     SOCIAL HISTORY  Social History     Socioeconomic History   • Marital status: /Civil Union     Spouse name: Not on file   • Number of children: 2   • Years of education: Not on file   • Highest education level: Not on file   Occupational History   • Not on file   Tobacco Use   • Smoking status: Former     Packs/day: 1.00     Types: Cigarettes     Quit date: 46     Years since quittin.6   • Smokeless tobacco: Never   Vaping Use   • Vaping Use: Never used   Substance and Sexual Activity   • Alcohol use: Yes     Comment: occasional   • Drug use: No   • Sexual activity: Not Currently     Partners: Male   Other Topics Concern   • Not on file   Social History Narrative    Lives with spouse     Social Determinants of Health     Financial Resource Strain: Not on file   Food Insecurity: Not on file   Transportation Needs: Not on file   Physical Activity: Not on file   Stress: Not on file   Social Connections: Not on file   Intimate Partner Violence: Not on file   Housing Stability: Not on file       Objective     VITAL SIGNS  /74   Pulse 66   Ht 5' 4" (1.626 m)   Wt 86.2 kg (190 lb)   BMI 32.61 kg/m²      PHYSICAL EXAM  General:   Well-appearing  No acute distress  Appears stated age    PHYSICAL EXAMINATION  General: The patient is alert, oriented, and pleasant to interact with. Neuro: Sensation intact to light touch in bilateral lower extremities. Skin: No significant abrasions or lesions over the area examined. Vessels: Palpable pedal pulse. Musculoskeletal: Left Knee Examination:  Gait: Normal  Alignment: normal  Effusion: none  ROM: 0-120 vs. 0 to 135 on opposite side. Crepitus no  TTP: Lateral joint line  Hyperflexion: painless  Dwayne's Test Negative   Lachman's Test 1A  Anterior Drawer Test 1A   Posterior Drawer Test 1A  Valgus Stress Test stable at 0 and 30 degrees  Varus Stress Test stable at 0 and 30 degrees  Lateral Patellar Glide 1/4  Medial Patellar Glide 1/4  Patellar Apprehension painless  Quadriceps: Atrophy present No   Strength 5/5    RADIOGRAPHIC EXAMINATION/DIAGNOSTICS:  Procedure: MRI knee left  wo contrast    Result Date: 7/26/2023  Narrative: MRI LEFT KNEE INDICATION:   M25.562: Pain in left knee G89.29: Other chronic pain M17.12: Unilateral primary osteoarthritis, left knee S89. 92XD: Unspecified injury of left lower leg, subsequent encounter M71.22: Synovial cyst of popliteal space (Baker), left knee. COMPARISON: Plain film 1/28/2023 TECHNIQUE:    Multiplanar/multisequence MR of the left knee was performed. FINDINGS: SUBCUTANEOUS TISSUES: Normal JOINT EFFUSION: Small joint effusion with prominent synovitis. Devoria Jose David PURCELL'S CYST: Large Baker's cyst with prominent synovitis MENISCI: Intact. CRUCIATE LIGAMENTS: Intact. EXTENSOR APPARATUS: Intact. COLLATERAL LIGAMENTS: Intact. ARTICULAR SURFACES: Focal full-thickness cartilage loss at the lateral tibial plateau. Mild grade 2 cartilage loss medial patellar facet. BONES: Normal. MUSCULATURE:  Intact. Impression: 1. Mild degenerative change without evidence of full-thickness cartilage loss. 2. Joint effusion and prominent Baker's cyst both exhibiting reactive synovitis.  3. No evidence of meniscal tear or other internal derangement. Workstation performed: [id number]       ASSESSMENT/PLAN:  1. Left knee pain, moderate tricompartmental osteoarthritis    Today, we discussed the non-operative treatment options that include, but are not limited to: rest, ice, activity modification, anti-inflammatory medication, physical therapy, and/or injections. Patient with like to initially proceed with these conservative measures. After discussion of options for formal physical therapy, anti-inflammatories and bracing with other conservative treatments, patient opted to trial these initially. PT order was placed today. she will plan on following up for recheck p.r.n., they voiced their understanding of this plan and were in agreement with it. All questions answered today.       If any issues, questions, or concerns arise between now and the next appointment, we have encouraged the patient contact our team.

## 2023-08-04 ENCOUNTER — EVALUATION (OUTPATIENT)
Age: 83
End: 2023-08-04
Payer: COMMERCIAL

## 2023-08-04 DIAGNOSIS — M17.12 PRIMARY OSTEOARTHRITIS OF LEFT KNEE: Primary | ICD-10-CM

## 2023-08-04 PROCEDURE — 97162 PT EVAL MOD COMPLEX 30 MIN: CPT

## 2023-08-04 PROCEDURE — 97110 THERAPEUTIC EXERCISES: CPT

## 2023-08-07 NOTE — PROGRESS NOTES
Daily Note     Today's date: 2023  Patient name: Renaldo Matt  : 1940  MRN: [id number]  Referring provider: Yuriy Maloney, *  Dx:   Encounter Diagnosis     ICD-10-CM    1. Primary osteoarthritis of left knee  M17.12           Start Time: 730  Stop Time: 815  Total time in clinic (min): 45 minutes    Subjective: pt states she just woke up-still "waking up"; admits to not doing exer over the w/e b/c she was busy      Objective: See treatment diary below      Assessment: Pt tolerated today's treatment session without issue   Initiated Nustep, sit to stand, and standing exer today during session. Pt challenged with sit to stand and requires A of 1 hand. Pt's gait is slow and unsteady. Pt completed exer with no adverse reactions  -VPs required for pt to stand upright. Pt continues to benefit from skilled PT services. Will continue to encourage HEP and PT attendance while addressing pt's  functional deficits & focusing on progression of POC as patient tolerates. Patient performed Nustep aerobic exercise to increase blood flow to the area being treated, prepare the muscles for strength training and stretching, improve overall tolerance to activity, and aerobic endurance. Plan: Continue per plan of care.       Precautions: HTN, fatigue, obesity, L ankle ORIF       23           Manuals #1 #2   FOTO     Re-eval   PROM/MOB L knee, patella AE AE                                                  Neuro Re-Ed                                                                                                        Ther Ex             Nustep seat 8 - L1 x5'           Stand:  -alt hip f, e,ab  -march  -alt knee flex (butt kicks)  -squats (shallow) - x10 ea           HC stretch @ step - -           Sit to stand x1 x5 one hand           Sit LAQ/HS  x10 x10           Supine: B  -QS  -SAQ  -Hip add iso x10 ea x10 ea           Supine:  -SLR  -bridging x10 x10           Supine:  -alt KTC - S/l:  -hip abd                                       Ther Activity                                       Gait Training                                       Modalities

## 2023-08-08 ENCOUNTER — OFFICE VISIT (OUTPATIENT)
Age: 83
End: 2023-08-08
Payer: COMMERCIAL

## 2023-08-08 DIAGNOSIS — M17.12 PRIMARY OSTEOARTHRITIS OF LEFT KNEE: Primary | ICD-10-CM

## 2023-08-08 PROCEDURE — 97110 THERAPEUTIC EXERCISES: CPT

## 2023-08-08 NOTE — PROGRESS NOTES
Daily Note     Today's date: 8/10/2023  Patient name: Marco Quick  : 1940  MRN: [id number]  Referring provider: Braden Pacheco, *  Dx:   Encounter Diagnosis     ICD-10-CM    1. Primary osteoarthritis of left knee  M17.12           Start Time: 1745  Stop Time: 1830  Total time in clinic (min): 45 minutes    Subjective: pt reports no pain currently,but, in general, both knees are stiff. Reports she hasn't been doing HEP. Objective: See treatment diary below      Assessment: Pt tolerated today's treatment session well    Initiated HC stretch at the step and supine KTC today during session . Pt challenged with standing exer-c/o fatigue. Pt completed exer with no adverse reactions  . Pt's gait cont to be unsteady-most likely due limited heel strike-toe off mechanics, forward trunk, and stiffness in hips   Pt continues to benefit from skilled PT services. Will continue to encourage HEP and PT attendance while addressing pt's  functional deficits & focusing on progression of POC as patient tolerates. Patient performed Nustep aerobic exercise to increase blood flow to the area being treated, prepare the muscles for strength training and stretching, improve overall tolerance to activity, and aerobic endurance. Plan: Continue per plan of care.       Precautions: HTN, fatigue, obesity, L ankle ORIF       8/4/23 8/8/23 8/10/23          Manuals #1 #2 #3  FOTO     Re-eval   PROM/MOB L knee, patella AE AE AE                                                 Neuro Re-Ed                                                                                                        Ther Ex             Nustep seat 8 - L1 x5' L1 x 6'          Stand:  -alt hip f, e,ab  -march  -alt knee flex (butt kicks)  -squats (shallow) - x10 ea x10 ea          HC stretch @ step - - 3x20" ea          Sit to stand x1 x5 one hand X 10 one hand          Sit LAQ/HS  x10 x10 x10 ea          Supine: B  -QS  -SAQ  -Hip add iso x10 ea x10 ea x10 ea          Supine:  -SLR  -bridging x10 x10 x10 ea          Supine:  -alt KTC - - x10 ea          S/l:  -hip abd                                       Ther Activity                                       Gait Training                                       Modalities

## 2023-08-10 ENCOUNTER — OFFICE VISIT (OUTPATIENT)
Age: 83
End: 2023-08-10
Payer: COMMERCIAL

## 2023-08-10 DIAGNOSIS — M17.12 PRIMARY OSTEOARTHRITIS OF LEFT KNEE: Primary | ICD-10-CM

## 2023-08-10 PROCEDURE — 97110 THERAPEUTIC EXERCISES: CPT

## 2023-08-11 NOTE — PROGRESS NOTES
Daily Note     Today's date: 8/15/2023  Patient name: Salvatore Vela  : 1940  MRN: [id number]  Referring provider: Brett De La Cruz, *  Dx:   Encounter Diagnosis     ICD-10-CM    1. Primary osteoarthritis of left knee  M17.12           Start Time: 1205  Stop Time: 1300  Total time in clinic (min): 55 minutes    Subjective: pt reports stiffness      Objective: See treatment diary below      Assessment: Pt tolerated today's treatment session well    Initiated s/l hip abd and clams as well as  inc reps to x 20 for most mat exer today during session. . Pt completed exer with no adverse reactions  though challenged w/ SLR on R. Cont to exhibit dec heelstrike and toe off w/ dec hip fluidity during gait-pt able to correct this w/ VPs and carryover is better when utilizing spc-which pt is trying not to use. Discussed w/ pt that a spc would be safer at this time. Pt continues to benefit from skilled PT services. Will continue to encourage HEP and PT attendance while addressing pt's  functional deficits & focusing on progression of POC as patient tolerates. Patient performed Nustep aerobic exercise to increase blood flow to the area being treated, prepare the muscles for strength training and stretching, improve overall tolerance to activity, and aerobic endurance. Plan: Continue per plan of care.       Precautions: HTN, fatigue, obesity, L ankle ORIF       8/4/23 8/8/23 8/10/23 8/15/23         Manuals #1 #2 #3 #4 FOTO     Re-eval   PROM/MOB L knee, patella AE AE AE -                                                Neuro Re-Ed                                                                                                        Ther Ex             Recumbent bike seat 12  - - -L1 x10'         Nustep seat 8 - L1 x5' L1 x 6' -         Stand:  -alt hip f, e,ab  -march  -alt knee flex (butt kicks)  -squats (shallow) - x10 ea x10 ea x10         HC stretch @ step - - 3x20" ea 3x20" ea         Sit to stand x1 x5 one hand X 10 one hand X 10 one hand         Sit LAQ/HS  x10 x10 x10 ea x20         Supine: B  -QS  -SAQ  -Hip add iso x10 ea x10 ea x10 ea x20 ea         Supine:  -SLR  -bridging x10 x10 x10 ea 2x10         Supine:  -alt KTC - - x10 ea          S/l:  -hip abd  -clams - - - x10 R/L                                   Ther Activity                                       Gait Training                                       Modalities

## 2023-08-15 ENCOUNTER — OFFICE VISIT (OUTPATIENT)
Age: 83
End: 2023-08-15
Payer: COMMERCIAL

## 2023-08-15 DIAGNOSIS — M17.12 PRIMARY OSTEOARTHRITIS OF LEFT KNEE: Primary | ICD-10-CM

## 2023-08-15 PROCEDURE — 97110 THERAPEUTIC EXERCISES: CPT

## 2023-08-16 NOTE — PROGRESS NOTES
Daily Note     Today's date: 2023  Patient name: Billy Parker  : 1940  MRN: [id number]  Referring provider: Sharla Barnett, *  Dx:   Encounter Diagnosis     ICD-10-CM    1. Primary osteoarthritis of left knee  M17.12           Start Time: 1210  Stop Time: 1310  Total time in clinic (min): 60 minutes    Subjective: pt brought her cane and states that the drAndrew Already raised the height before. Objective: See treatment diary below      Assessment: Pt tolerated today's treatment session well   Pt's cane height raised which helps to counter pt's forward flex trunk posture. Pt challenged with L ankle soreness since initiating PT/exer (pt w/ h/o L ankle ORIF). Pt completed exer with no adverse reactions  . Pt continues to benefit from skilled PT services. Will continue to encourage HEP and PT attendance while addressing pt's  functional deficits & focusing on progression of POC as patient tolerates. Patient performed Nustep aerobic exercise to increase blood flow to the area being treated, prepare the muscles for strength training and stretching, improve overall tolerance to activity, and aerobic endurance. Plan: Continue per plan of care.       Precautions: HTN, fatigue, obesity, L ankle ORIF       8/4/23 8/8/23 8/10/23 8/15/23 8/17/23        Manuals #1 #2 #3 #4 #5 FOTO    Re-eval   PROM/MOB L knee, patella AE AE AE -                                                Neuro Re-Ed                                                                                                        Ther Ex             Recumbent bike seat 12  - - -L1 x10' -        Nustep seat 8 - L1 x5' L1 x 6' - L3 x 10'        Stand:  -alt hip f, e,ab  -march  -alt knee flex (butt kicks)  -squats (shallow) - x10 ea x10 ea x10 x10 ea        Stand:  HT raise - - - - x10        HC stretch @ step - - 3x20" ea 3x20" ea 3x20" ea        Sit to stand x1 x5 one hand X 10 one hand X 10 one hand x10 one hand        Sit LAQ/HS  x10 x10 x10 ea x20 x20        Supine: B  -QS  -SAQ  -Hip add iso x10 ea x10 ea x10 ea x20 ea x20 ea        Supine:  -SLR  -bridging x10 x10 x10 ea 2x10 2x10 ea        Supine:  -alt KTC - - x10 ea          S/l:  -hip abd  -clams - - - x10 R/L                                   Ther Activity                                       Gait Training                                       Modalities

## 2023-08-17 ENCOUNTER — OFFICE VISIT (OUTPATIENT)
Age: 83
End: 2023-08-17
Payer: COMMERCIAL

## 2023-08-17 DIAGNOSIS — M17.12 PRIMARY OSTEOARTHRITIS OF LEFT KNEE: Primary | ICD-10-CM

## 2023-08-17 PROCEDURE — 97110 THERAPEUTIC EXERCISES: CPT

## 2023-08-17 NOTE — PROGRESS NOTES
Daily Note     Today's date: 2023  Patient name: Cornelia Larson  : 1940  MRN: [id number]  Referring provider: Lio Ruff, *  Dx:   Encounter Diagnosis     ICD-10-CM    1. 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 sit to stand from chair w/ improved fluidity and ease. Pt challenged with compliance w/ HEP. Pt completed exer with no adverse reactions  ,though some shld soreness w/ s/l exer today (sore from activity yesterday) . Pt continues to benefit from skilled PT services. Will continue to encourage HEP and PT attendance while addressing pt's  functional deficits & focusing on progression of POC as patient tolerates. Patient performed Nustep aerobic exercise to increase blood flow to the area being treated, prepare the muscles for strength training and stretching, improve overall tolerance to activity, and aerobic endurance. Plan: Continue per plan of care.       Precautions: HTN, fatigue, obesity, L ankle ORIF       8/4/23 8/8/23 8/10/23 8/15/23 8/17/23 8/22/23       Manuals #1 #2 #3 #4 #5 #6 FOTO   Re-eval   PROM/MOB L knee, patella AE AE AE - - -                                              Neuro Re-Ed                                                                                                        Ther Ex             Recumbent bike seat 12  - - -L1 x10' - -       Nustep seat 8 - L1 x5' L1 x 6' - L3 x 10' L3  x10'       Stand:  -alt hip f, e,ab  -march  -alt knee flex (butt kicks)  -squats (shallow) - x10 ea x10 ea x10 x10 ea x10 ea       Stand:  HT raise - - - - x10 x20       HC stretch @ step - - 3x20" ea 3x20" ea 3x20" ea oscillate R/L x ~10       Sit to stand x1 x5 one hand X 10 one hand X 10 one hand x10 one hand x10 one hand       Sit LAQ/HS  x10 x10 x10 ea x20 x20 x20       Supine: B  -QS  -SAQ  -Hip add iso x10 ea x10 ea x10 ea x20 ea x20 ea x20 ea        Supine:  -SLR  -bridging x10 x10 x10 ea 2x10 2x10 ea 2x10       Supine:  -alt KTC - - x10 ea   x10 ea       S/l:  -hip abd  -clams - - - x10 R/L  x10  R/L  -                                 Ther Activity                                       Gait Training                                       Modalities

## 2023-08-22 ENCOUNTER — OFFICE VISIT (OUTPATIENT)
Age: 83
End: 2023-08-22
Payer: COMMERCIAL

## 2023-08-22 DIAGNOSIS — M17.12 PRIMARY OSTEOARTHRITIS OF LEFT KNEE: Primary | ICD-10-CM

## 2023-08-22 PROCEDURE — 97110 THERAPEUTIC EXERCISES: CPT

## 2023-08-22 NOTE — PROGRESS NOTES
Daily Note     Today's date: 2023  Patient name: Castillo Gramajo  : 1940  MRN: [id number]  Referring provider: Saray Officer, *  Dx:   Encounter Diagnosis     ICD-10-CM    1. Primary osteoarthritis of left knee  M17.12           Start Time: 730  Stop Time: 830  Total time in clinic (min): 60 minutes    Subjective: pt reports L ankle stiff from previous injury and feels this has impacted her walking since it occurred      Objective: See treatment diary below      Assessment: Pt tolerated today's treatment session well    Initiated inc reps for march/squats as well as sit to stand  today during session and patient able to perform sit to stand w/o armrest support (hands on knees) . Pt completed exer with no adverse reactions  . Pt continues to benefit from skilled PT services. Will continue to encourage HEP and PT attendance while addressing pt's  functional deficits & focusing on progression of POC as patient tolerates. Patient performed Nustep aerobic exercise to increase blood flow to the area being treated, prepare the muscles for strength training and stretching, improve overall tolerance to activity, and aerobic endurance. Plan: Continue per plan of care.       Precautions: HTN, fatigue, obesity, L ankle ORIF       8/4/23 8/8/23 8/10/23 8/15/23 8/17/23 8/22/23 8/24/23      Manuals #1 #2 #3 #4 #5 #6 FOTO-done   Re-eval   PROM/MOB L knee, patella AE AE AE - - - -                                             Neuro Re-Ed                                                                                                        Ther Ex             Recumbent bike seat 12  - - -L1 x10' - - -      Nustep seat 8 - L1 x5' L1 x 6' - L3 x 10' L3  x10' L3  x10'      Stand:  -alt hip f, e,ab  -march  -alt knee flex (butt kicks)  -squats (shallow) - x10 ea x10 ea x10 x10 ea x10 ea x10 ea except 20 squats and march      Stand:  HT raise - - - - x10 x20 x20      HC stretch @ step - - 3x20" ea 3x20" ea 3x20" ea oscillate R/L x ~10 oscillate R/L x ~10      Sit to stand x1 x5 one hand X 10 one hand X 10 one hand x10 one hand x10 one hand  x10 one hand;  x10 hands on knees      Sit LAQ/HS  x10 x10 x10 ea x20 x20 x20 x20      Supine: B  -QS  -SAQ  -Hip add iso x10 ea x10 ea x10 ea x20 ea x20 ea x20 ea  x20 ea      Supine:  -SLR  -bridging x10 x10 x10 ea 2x10 2x10 ea 2x10 2x10      Supine:  -alt KTC - - x10 ea   x10 ea x10 ea      S/l:  -hip abd  -clams - - - x10 R/L  x10  R/L  - x10  R/L  -                                  Ther Activity                                       Gait Training                                       Modalities

## 2023-08-24 ENCOUNTER — OFFICE VISIT (OUTPATIENT)
Age: 83
End: 2023-08-24
Payer: COMMERCIAL

## 2023-08-24 DIAGNOSIS — M17.12 PRIMARY OSTEOARTHRITIS OF LEFT KNEE: Primary | ICD-10-CM

## 2023-08-24 PROCEDURE — 97110 THERAPEUTIC EXERCISES: CPT

## 2023-08-25 NOTE — PROGRESS NOTES
Daily Note     Today's date: 2023  Patient name: Muriel   : 1940  MRN: [id number]  Referring provider: Hoa Gonzalez, *  Dx:   Encounter Diagnosis     ICD-10-CM    1. Primary osteoarthritis of left knee  M17.12           Start Time: 1220  Stop Time: 1300  Total time in clinic (min): 40 minutes    Subjective[place] ex[place]. Didn't want to come to PT today. Objective: See treatment diary below      Assessment: Pt tolerated today's treatment session well     Pt demonstrating dec fluidity w/ gait as compared to last session-pt more sedentary over the w/e and didn't perform HEP which most likely contributes. Discussed this w/ pt-I.e. that she needs to keep moving . Pt completed exer with no adverse reactions  . Pt continues to benefit from skilled PT services. Will continue to encourage HEP and PT attendance while addressing pt's  functional deficits & focusing on progression of POC as patient tolerates. Plan: Continue per plan of care.       Precautions: HTN, fatigue, obesity, L ankle ORIF       8/4/23 8/8/23 8/10/23 8/15/23 8/17/23 8/22/23 8/24/23 8/29/23     Manuals #1 #2 #3 #4 #5 #6 FOTO-done #8  Re-eval   PROM/MOB L knee, patella AE AE AE - - - -                                             Neuro Re-Ed                                                                                                        Ther Ex             Recumbent bike seat 12  - - -L1 x10' - - - -     Nustep seat 8 - L1 x5' L1 x 6' - L3 x 10' L3  x10' L3  x10' Held due to time constraint (pt late)     Stand:  -alt hip f, e,ab  -march  -alt knee flex (butt kicks)  -squats (shallow) - x10 ea x10 ea x10 x10 ea x10 ea x10 ea except 20 squats and march x10 ea except 20 squats and march     Stand:  HT raise - - - - x10 x20 x20 x20     HC stretch @ step - - 3x20" ea 3x20" ea 3x20" ea oscillate R/L x ~10 oscillate R/L x ~10 oscillate R/L x ~10     Sit to stand x1 x5 one hand X 10 one hand X 10 one hand x10 one hand x10 one hand  x10 one hand;  x10 hands on knees x10 one hand;  x10 hands on knees     Sit LAQ/HS  x10 x10 x10 ea x20 x20 x20 x20 x20     Supine: B  -QS  -SAQ  -Hip add iso x10 ea x10 ea x10 ea x20 ea x20 ea x20 ea  x20 ea x20 ea     Supine:  -SLR  -bridging x10 x10 x10 ea 2x10 2x10 ea 2x10 2x10 2x10     Supine:  -alt KTC - - x10 ea   x10 ea x10 ea x10 ea     S/l:  -hip abd  -clams - - - x10 R/L  x10  R/L  - x10  R/L  -                                  Ther Activity                                       Gait Training                                       Modalities

## 2023-08-29 ENCOUNTER — OFFICE VISIT (OUTPATIENT)
Age: 83
End: 2023-08-29
Payer: COMMERCIAL

## 2023-08-29 DIAGNOSIS — M17.12 PRIMARY OSTEOARTHRITIS OF LEFT KNEE: Primary | ICD-10-CM

## 2023-08-29 PROCEDURE — 97110 THERAPEUTIC EXERCISES: CPT

## 2023-08-29 NOTE — PROGRESS NOTES
Daily Note     Today's date: 2023  Patient name: Fredis Shankar  : 1940  MRN: [id number]  Referring provider: Lawyer Tilley, *  Dx:   Encounter Diagnosis     ICD-10-CM    1. Primary osteoarthritis of left knee  M17.12           Start Time: 1215  Stop Time: 1308  Total time in clinic (min): 53 minutes    Subjective: my knee doesn't like therapy/exercises      Objective: See treatment diary below      Assessment: Pt tolerated today's treatment session well    Pt walking more fluidly as compared to last session (following sedentary vacation), though still w/ limited hip mov't/forward flex trunk. Pt challenged with HEP compliance. Pt completed exer with no adverse reactions  . Pt continues to benefit from skilled PT services. Will continue to encourage HEP and PT attendance while addressing pt's  functional deficits & focusing on progression of POC as patient tolerates. Patient performed Nustep aerobic exercise to increase blood flow to the area being treated, prepare the muscles for strength training and stretching, improve overall tolerance to activity, and aerobic endurance. Plan: Continue per plan of care.       Precautions: HTN, fatigue, obesity, L ankle ORIF       8/4/23 8/8/23 8/10/23 8/15/23 8/17/23 8/22/23 8/24/23 8/29/23 8/31/23    Manuals #1 #2 #3 #4 #5 #6 FOTO-done #8 #9 Re-eval   PROM/MOB L knee, patella AE AE AE - - - -                                             Neuro Re-Ed                                                                                                        Ther Ex             Recumbent bike seat 12  - - -L1 x10' - - - -     Nustep seat 8 - L1 x5' L1 x 6' - L3 x 10' L3  x10' L3  x10' Held due to time constraint (pt late) L3  x10'    Stand:  -alt hip f, e,ab  -march  -alt knee flex (butt kicks)  -squats (shallow) - x10 ea x10 ea x10 x10 ea x10 ea x10 ea except 20 squats and march x10 ea except 20 squats and march X2 0 ea except 20 squats and march    Stand:  HT raise - - - - x10 x20 x20 x20 x20    HC stretch @ step - - 3x20" ea 3x20" ea 3x20" ea oscillate R/L x ~10 oscillate R/L x ~10 oscillate R/L x ~10 x5 ea side    Sit to stand x1 x5 one hand X 10 one hand X 10 one hand x10 one hand x10 one hand  x10 one hand;  x10 hands on knees x10 one hand;  x10 hands on knees x10 one hand;  x10 hands on knees    Sit LAQ/HS  x10 x10 x10 ea x20 x20 x20 x20 x20 x20    Supine: B  -QS  -SAQ  -Hip add iso x10 ea x10 ea x10 ea x20 ea x20 ea x20 ea  x20 ea x20 ea x10    Supine:  -SLR  -bridging x10 x10 x10 ea 2x10 2x10 ea 2x10 2x10 2x10 x10 ea    Supine:  -alt KTC - - x10 ea   x10 ea x10 ea x10 ea     S/l:  -hip abd  -clams - - - x10 R/L  x10  R/L  - x10  R/L  -                                  Ther Activity                                       Gait Training                                       Modalities

## 2023-08-31 ENCOUNTER — OFFICE VISIT (OUTPATIENT)
Age: 83
End: 2023-08-31
Payer: COMMERCIAL

## 2023-08-31 DIAGNOSIS — M17.12 PRIMARY OSTEOARTHRITIS OF LEFT KNEE: Primary | ICD-10-CM

## 2023-08-31 PROCEDURE — 97110 THERAPEUTIC EXERCISES: CPT

## 2023-08-31 NOTE — PROGRESS NOTES
PT Re-Evaluation     Today's date: 2023  Patient name: Kayla Davis  : 1940  MRN: [id number]  Referring provider: Kenn Pierce, *  Dx:   Encounter Diagnosis     ICD-10-CM    1. Primary osteoarthritis of left knee  M17.12           Start Time: 1232  Stop Time: 1305  Total time in clinic (min): 33 minutes    Assessment  Assessment details: 83 yo female who was initially referred to PT with dx of  L knee OA. Pt presents with 0-3/10 pain scale-with pain felt "inside" (initially more lat aspect of knee upon eval)  Physical exam today reveals B hip flex and abd dec from eval (3-/5) and L knee weakness (= grossly 4-/5) and hip flexor tightness and dec L knee flex. Pt demonstrates and/or c/o difficulty with prolonged standing/walking including stair amb. Recently, pt w/ noted improved sit to stand transfers & was able to perform unsupported x 10 reps, today, however, pt unable and required BUE support to stand. In addition to pt's change in status that occurred yesterday,  pt's progress is impacted by her non-compliance w/ HEP and recent long weekend trip in which she was quite sedentary. Pt w/ very obvious worsening of gait/posture and fluidity of mov't upon return from that trip. Discussed w/ pt that she needs to play a more active role in her recovery. she would cont to benefit from PT at this time to further improve her sx and develop her HEP in accordance with her progress in order to resume her PLOF.   Impairments: abnormal gait, abnormal or restricted ROM, activity intolerance, impaired balance, impaired physical strength, lacks appropriate home exercise program, pain with function, poor posture  and poor body mechanics    Symptom irritability: moderate  Goals  Pt will achieve the following goals in the next 2-4 weeks  STG 23  Indep with current HEP  Dec pain by 1-3 levels  mprove L knee AROM by 2-5 deg  Improve L knee/LE strength by 1/2 grade      STG 23  Indep with current HEP (not met)  Dec pain by 1-3 levels (not met)  Improve L knee AROM by 2-5 deg (not met)  Improve L knee/LE strength by 1/2 grade (not met)  Initiate posture, joint conservation and body mechanics education (met)  Consistent PT attendance BIW (met)      Pt will achieve the following goals by d/c (8-12 weeks, or as stated in plan)  LTG  indep and compliant with HEP in order to maximize gains achieved in therapy  0-2/10 pain scale in order to feel better and decrease/eliminate use of  pain &/or anti-inflammatory  meds  L knee/LE ROM WFL and  strength 4-5/5 in order to improve functional mobility   Amb safely level/unlevel x community distances with appropriate AD  Pt will demonstrate normalized gait quality on level surfaces as evidenced by normal heel to toe sequence, improved fluidity and normal step length  Exhibit carryover of proper posture and joint conservation techniques and body mechanics in order to dec unnecessary muscle strain and preserve joint integrity  Improve  FOTO score to 55 or more to indicate inc functional ability of patient   Resume activities,including prolonged sit to stand transfers, walking, standing and stair amb, safely with less pain and limitation at her premorbid  intensity and duration    Plan  Patient would benefit from: skilled physical therapy  Planned modality interventions: cryotherapy and thermotherapy: hydrocollator packs  Planned therapy interventions: manual therapy, joint mobilization, balance, neuromuscular re-education, patient education, strengthening, stretching, therapeutic activities, therapeutic exercise, flexibility, body mechanics training, postural training, gait training and home exercise program  Frequency: 2x week  Duration in visits: 12  Duration in weeks: 6  Plan of Care beginning date: 9/5/2023  Plan of Care expiration date: 10/16/2023  Treatment plan discussed with: patient        Subjective Evaluation    History of Present Illness  Onset date: January .  Mechanism of injury: 23 re-eval-pt states she awoke yesterday w/ a lot of discomfort and stiffness in L knee. Trouble getting oob and couldn't walk long at [facility] that day even w/ cart support. Had a lot of trouble w/ sit to stand yesterday too-though was able to last week. Alt ice/heat w/elevation since yesterday. Bothers mostly when standing/walking-no pain when sitting. Cont w/ B shld pain as well (thinks its from her fall in )  Pt states she is going to call her previous surgeon, Dr. Maki Hurt for ortho consult. She is looking to have a TKR before she "is too old"  23 Eval-pt presented to orthopedics 23 w/ c/o L knee pain since falling 2023. MRI/xrays reveal moderate tricompartmental osteoarthritis w/ Baker's cyst. No relief w/ steroid injection. Pt now referred for PT intervention. (pt w/ h/o R TKR)  B shld pain due to fall as well (braces fall w/ arms)-can't lift OH well-takes Aleve which helps  Pt reports she is walking less de to pain. Using spc outside of home. Standing prolonged bothers  Steps-2 railings/spc step to step pattern-feels unstable  No pain sitting-but after awhile bothers. 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  Gets swelling w/ prolonged standing /walking as well  Some trouble sleeping-does nap during the day  Patient Goals  Patient goals for therapy: decreased pain and increased strength  Patient goal: resume activities w/ less pain and limitation at premorbid intensity and duration (progressing)  Pain  Current pain ratin  At best pain ratin  At worst pain rating: 3 (=)  Location: L knee  Relieving factors: ice  Aggravating factors: standing, walking and stair climbing  Progression: worsening    Social Support  Stairs in house: yes   Lives in: multiple-level home  Lives with: spouse      Diagnostic Tests  MRI studies: abnormal (23:  L knee  1. Mild degenerative change without evidence of full-thickness cartilage loss. 2. Joint effusion and prominent Baker's cyst both exhibiting reactive synovitis. 3. No evidence of meniscal tear or other internal derangement.)  Treatments  Current treatment: physical therapy        Objective     Palpation     Additional Palpation Details  L knee edema (ant knee/lower leg); General soreness to palp of L knee/thigh/calf but not point specific/sharp. Active Range of Motion   Left Hip   Flexion: 70 (SLR) degrees     Right Hip   Flexion: 70 (SLR) degrees   Left Knee   Flexion: 115 (KTC w/ pain;   112 deg sitting) degrees   Extension: 0 (improved) degrees   Extensor la (improved) degrees     Right Knee   Flexion: 121 (KTC) degrees   Extension: 0 degrees   Extensor la degrees     Passive Range of Motion     Additional Passive Range of Motion Details  B Hip flexor tightness    Strength/Myotome Testing     Left Hip   Planes of Motion   Flexion: 3- (dec)  Abduction: 3- (dec)  External rotation: 4-  Internal rotation: 4    Right Hip   Planes of Motion   Flexion: 4-  Abduction: 4-  External rotation: 5  Internal rotation: 5    Left Knee   Flexion: 4- (pain; =)  Extension: 4- (pain; =)    Right Knee   Flexion: 5  Extension: 5    Left Ankle/Foot   Dorsiflexion: 5  Plantar flexion: 5    Right Ankle/Foot   Dorsiflexion: 5  Plantar flexion: 5    Additional Strength Details  Bridging intact unsupported w/ minimal elevation  Deep squat to ~30 deg knee flex supported   Heel raise (on toes) intact w/ moderate elevation supported  Toe raise (on heels) intact w/ minimal elevation supported      Ambulation     Comments   Amb indep with sp[facility] clinic distances and parking lot to clinic.  Pt with gait deviations noted: forward trunk, no toe off, short step length, dec hip mov't, less fluid             Precautions: HTN, fatigue, obesity          8/15/23 8/17/23 8/22/23 8/24/23 8/29/23 8/31/23 9/5/23   Manuals #11   #4 #5 #6 FOTO-done #8 #9 Re-eval   PROM/MOB L knee, patella    - - - -                                             Neuro Re-Ed                                                                                                        Ther Ex             Recumbent bike seat 12    -L1 x10' - - - -     Nustep seat 8    - L3 x 10' L3  x10' L3  x10' Held due to time constraint (pt late) L3  x10' held-pt late and reassessment performed   Stand:  -alt hip f, e,ab  -march  -alt knee flex (butt kicks)  -squats (shallow)    x10 x10 ea x10 ea x10 ea except 20 squats and march x10 ea except 20 squats and march X2 0 ea except 20 squats and march held-pt late and reassessment performed   Stand:  HT raise    - x10 x20 x20 x20 x20 x20   HC stretch @ step    3x20" ea 3x20" ea oscillate R/L x ~10 oscillate R/L x ~10 oscillate R/L x ~10 x5 ea side    Sit to stand    X 10 one hand x10 one hand x10 one hand  x10 one hand;  x10 hands on knees x10 one hand;  x10 hands on knees x10 one hand;  x10 hands on knees    Sit LAQ/HS     x20 x20 x20 x20 x20 x20    Supine: B  -QS  -SAQ  -Hip add iso    x20 ea x20 ea x20 ea  x20 ea x20 ea x10    Supine:  -SLR  -bridging    2x10 2x10 ea 2x10 2x10 2x10 x10 ea x5 ea   Supine:  -alt KTC      x10 ea x10 ea x10 ea     S/l:  -hip abd  -clams    x10 R/L  x10  R/L  - x10  R/L  -     x5                             Ther Activity                                       Gait Training                                       Modalities

## 2023-09-05 ENCOUNTER — EVALUATION (OUTPATIENT)
Age: 83
End: 2023-09-05
Payer: COMMERCIAL

## 2023-09-05 DIAGNOSIS — M17.12 PRIMARY OSTEOARTHRITIS OF LEFT KNEE: Primary | ICD-10-CM

## 2023-09-05 PROCEDURE — 97110 THERAPEUTIC EXERCISES: CPT

## 2023-09-05 PROCEDURE — 97164 PT RE-EVAL EST PLAN CARE: CPT

## 2023-09-07 ENCOUNTER — TELEPHONE (OUTPATIENT)
Age: 83
End: 2023-09-07

## 2023-09-07 ENCOUNTER — APPOINTMENT (OUTPATIENT)
Age: 83
End: 2023-09-07
Payer: COMMERCIAL

## 2023-09-07 NOTE — TELEPHONE ENCOUNTER
Caller: [place]o    Doctor: Amina Gonsalez    Reason for call: Would like to know if she can get a copy of the xrays that she had completed back in feb.      Call back#: [phone]

## 2023-09-07 NOTE — PROGRESS NOTES
Daily Note     Today's date: 2023  Patient name: Dusty Marino  : 1940  MRN: [id number]  Referring provider: Fredrick Coburn, *  Dx:   Encounter Diagnosis     ICD-10-CM    1. Primary osteoarthritis of left knee  M17.12                      Subjective: ***      Objective: See treatment diary below      Assessment: Pt tolerated today's treatment session {AE treatment ez:29006}   Initiated *** today during session and patient education provided on *** . Pt challenged with ***. Pt completed exer with {AEtreatmentquality:40126}. Pt felt positive relief of sx with ***. Pt continues to benefit from skilled PT services. Will continue to encourage HEP and PT attendance while addressing pt's  functional deficits & focusing on progression of POC as patient tolerates. Patient performed {KUAerobic:66772} aerobic exercise to increase blood flow to the area being treated, prepare the muscles for strength training and stretching, improve overall tolerance to activity, and aerobic endurance. Plan: Continue per plan of care.       Precautions: HTN, fatigue, obesity       9/7/23   8/15/23 8/17/23 8/22/23 8/24/23 8/29/23 8/31/23 9/5/23   Manuals #11   #4 #5 #6 FOTO-done #8 #9 Re-eval   PROM/MOB L knee, patella    - - - -                                             Neuro Re-Ed                                                                                                        Ther Ex             Recumbent bike seat 12    -L1 x10' - - - -     Nustep seat 8    - L3 x 10' L3  x10' L3  x10' Held due to time constraint (pt late) L3  x10' held-pt late and reassessment performed   Stand:  -alt hip f, e,ab  -march  -alt knee flex (butt kicks)  -squats (shallow)    x10 x10 ea x10 ea x10 ea except 20 squats and march x10 ea except 20 squats and march X2 0 ea except 20 squats and march held-pt late and reassessment performed   Stand:  HT raise    - x10 x20 x20 x20 x20 x20   HC stretch @ step    3x20" ea 3x20" ea oscillate R/L x ~10 oscillate R/L x ~10 oscillate R/L x ~10 x5 ea side    Sit to stand    X 10 one hand x10 one hand x10 one hand  x10 one hand;  x10 hands on knees x10 one hand;  x10 hands on knees x10 one hand;  x10 hands on knees    Sit LAQ/HS     x20 x20 x20 x20 x20 x20    Supine: B  -QS  -SAQ  -Hip add iso    x20 ea x20 ea x20 ea  x20 ea x20 ea x10    Supine:  -SLR  -bridging    2x10 2x10 ea 2x10 2x10 2x10 x10 ea x5 ea   Supine:  -alt KTC      x10 ea x10 ea x10 ea     S/l:  -hip abd  -clams    x10 R/L  x10  R/L  - x10  R/L  -     x5                             Ther Activity                                       Gait Training                                       Modalities

## 2023-09-12 ENCOUNTER — APPOINTMENT (OUTPATIENT)
Age: 83
End: 2023-09-12
Payer: COMMERCIAL

## 2023-09-14 ENCOUNTER — APPOINTMENT (OUTPATIENT)
Age: 83
End: 2023-09-14
Payer: COMMERCIAL

## 2023-09-14 DIAGNOSIS — I10 BENIGN ESSENTIAL HYPERTENSION: ICD-10-CM

## 2023-09-18 RX ORDER — LOSARTAN POTASSIUM 50 MG/1
50 TABLET ORAL 2 TIMES DAILY
Qty: 180 TABLET | Refills: 0 | Status: SHIPPED | OUTPATIENT
Start: 2023-09-18

## 2023-09-18 NOTE — TELEPHONE ENCOUNTER
Please notify patient that I refilled her losartan, but she is overdue for med check appointment.   Please schedule

## 2023-09-21 NOTE — TELEPHONE ENCOUNTER
2nd attempt: LVM to notify pt of refill and requested pt call the office to schedule overdue AWV and med check

## 2024-02-05 ENCOUNTER — RA CDI HCC (OUTPATIENT)
Dept: OTHER | Facility: HOSPITAL | Age: 84
End: 2024-02-05

## 2024-02-05 PROBLEM — M19.90 ARTHRITIS: Status: ACTIVE | Noted: 2018-07-31

## 2024-02-05 NOTE — PROGRESS NOTES
HCC coding opportunities       Chart reviewed, no opportunity found: CHART REVIEWED, NO OPPORTUNITY FOUND   This is a reminder to address (resolve/update/assess) ALL HCC (risk adjustment) codes as found on active problem list for 2024 as patient scores reset to zero ARTI.  Also, just a reminder to please review and assess all other chronic conditions for 2024     Patients Insurance     Medicare Insurance: [employer]

## 2024-02-09 ENCOUNTER — TELEPHONE (OUTPATIENT)
Dept: FAMILY MEDICINE CLINIC | Facility: CLINIC | Age: 84
End: 2024-02-09

## 2024-02-12 ENCOUNTER — OFFICE VISIT (OUTPATIENT)
Dept: FAMILY MEDICINE CLINIC | Facility: CLINIC | Age: 84
End: 2024-02-12
Payer: COMMERCIAL

## 2024-02-12 VITALS
RESPIRATION RATE: 20 BRPM | WEIGHT: 194 LBS | SYSTOLIC BLOOD PRESSURE: 140 MMHG | OXYGEN SATURATION: 96 % | DIASTOLIC BLOOD PRESSURE: 84 MMHG | BODY MASS INDEX: 33.3 KG/M2 | TEMPERATURE: 97.3 F | HEART RATE: 75 BPM

## 2024-02-12 DIAGNOSIS — E78.2 MIXED HYPERLIPIDEMIA: ICD-10-CM

## 2024-02-12 DIAGNOSIS — M19.90 ARTHRITIS: ICD-10-CM

## 2024-02-12 DIAGNOSIS — S80.00XA CONTUSION OF KNEE, UNSPECIFIED LATERALITY, INITIAL ENCOUNTER: ICD-10-CM

## 2024-02-12 DIAGNOSIS — I10 BENIGN ESSENTIAL HYPERTENSION: Primary | ICD-10-CM

## 2024-02-12 DIAGNOSIS — E66.09 CLASS 1 OBESITY DUE TO EXCESS CALORIES WITHOUT SERIOUS COMORBIDITY WITH BODY MASS INDEX (BMI) OF 30.0 TO 30.9 IN ADULT: ICD-10-CM

## 2024-02-12 DIAGNOSIS — R60.9 EDEMA, UNSPECIFIED TYPE: ICD-10-CM

## 2024-02-12 DIAGNOSIS — H35.3220 EXUDATIVE AGE-RELATED MACULAR DEGENERATION OF LEFT EYE, UNSPECIFIED STAGE (HCC): ICD-10-CM

## 2024-02-12 PROCEDURE — 99214 OFFICE O/P EST MOD 30 MIN: CPT | Performed by: FAMILY MEDICINE

## 2024-02-12 PROCEDURE — G0439 PPPS, SUBSEQ VISIT: HCPCS | Performed by: FAMILY MEDICINE

## 2024-02-12 NOTE — ASSESSMENT & PLAN NOTE
Status post right total knee replacement.  Patient having increased left knee pain, especially since recent fall 3 weeks ago.  Recent injections through orthopedics.

## 2024-02-12 NOTE — ASSESSMENT & PLAN NOTE
Blood pressure reasonably controlled on losartan 50 twice daily, metoprolol 25, and hydrochlorothiazide 12.5

## 2024-02-12 NOTE — ASSESSMENT & PLAN NOTE
Weight 194, BMI 33.30.  Patient has been unable to exercise recently due to knee pain.  She is being followed by orthopedics.

## 2024-02-12 NOTE — PATIENT INSTRUCTIONS
Medicare Preventive Visit Patient Instructions  Thank you for completing your Welcome to Medicare Visit or Medicare Annual Wellness Visit today. Your next wellness visit will be due in one year (2/12/2025).  The screening/preventive services that you may require over the next 5-10 years are detailed below. Some tests may not apply to you based off risk factors and/or age. Screening tests ordered at today's visit but not completed yet may show as past due. Also, please note that scanned in results may not display below.  Preventive Screenings:  Service Recommendations Previous Testing/Comments   Colorectal Cancer Screening  * Colonoscopy    * Fecal Occult Blood Test (FOBT)/Fecal Immunochemical Test (FIT)  * Fecal DNA/Cologuard Test  * Flexible Sigmoidoscopy Age: 45-75 years old   Colonoscopy: every 10 years (may be performed more frequently if at higher risk)  OR  FOBT/FIT: every 1 year  OR  Cologuard: every 3 years  OR  Sigmoidoscopy: every 5 years  Screening may be recommended earlier than age 45 if at higher risk for colorectal cancer. Also, an individualized decision between you and your healthcare provider will decide whether screening between the ages of 76-85 would be appropriate. Colonoscopy: Not on file  FOBT/FIT: Not on file  Cologuard: Not on file  Sigmoidoscopy: Not on file          Breast Cancer Screening Age: 40+ years old  Frequency: every 1-2 years  Not required if history of left and right mastectomy Mammogram: Not on file        Cervical Cancer Screening Between the ages of 21-29, pap smear recommended once every 3 years.   Between the ages of 30-65, can perform pap smear with HPV co-testing every 5 years.   Recommendations may differ for women with a history of total hysterectomy, cervical cancer, or abnormal pap smears in past. Pap Smear: Not on file        Hepatitis C Screening Once for adults born between 1945 and 1965  More frequently in patients at high risk for Hepatitis C Hep C Antibody: Not  on file        Diabetes Screening 1-2 times per year if you're at risk for diabetes or have pre-diabetes Fasting glucose: 88 mg/dL (7/28/2022)  A1C: 5.5 % (7/28/2022)      Cholesterol Screening Once every 5 years if you don't have a lipid disorder. May order more often based on risk factors. Lipid panel: 07/28/2022          Other Preventive Screenings Covered by Medicare:  Abdominal Aortic Aneurysm (AAA) Screening: covered once if your at risk. You're considered to be at risk if you have a family history of AAA.  Lung Cancer Screening: covers low dose CT scan once per year if you meet all of the following conditions: (1) Age 55-77; (2) No signs or symptoms of lung cancer; (3) Current smoker or have quit smoking within the last 15 years; (4) You have a tobacco smoking history of at least 20 pack years (packs per day multiplied by number of years you smoked); (5) You get a written order from a healthcare provider.  Glaucoma Screening: covered annually if you're considered high risk: (1) You have diabetes OR (2) Family history of glaucoma OR (3)  aged 50 and older OR (4)  American aged 65 and older  Osteoporosis Screening: covered every 2 years if you meet one of the following conditions: (1) You're estrogen deficient and at risk for osteoporosis based off medical history and other findings; (2) Have a vertebral abnormality; (3) On glucocorticoid therapy for more than 3 months; (4) Have primary hyperparathyroidism; (5) On osteoporosis medications and need to assess response to drug therapy.   Last bone density test (DXA Scan): Not on file.  HIV Screening: covered annually if you're between the age of 15-65. Also covered annually if you are younger than 15 and older than 65 with risk factors for HIV infection. For pregnant patients, it is covered up to 3 times per pregnancy.    Immunizations:  Immunization Recommendations   Influenza Vaccine Annual influenza vaccination during flu season is  recommended for all persons aged >= 6 months who do not have contraindications   Pneumococcal Vaccine   * Pneumococcal conjugate vaccine = PCV13 (Prevnar 13), PCV15 (Vaxneuvance), PCV20 (Prevnar 20)  * Pneumococcal polysaccharide vaccine = PPSV23 (Pneumovax) Adults 19-65 yo with certain risk factors or if 65+ yo  If never received any pneumonia vaccine: recommend Prevnar 20 (PCV20)  Give PCV20 if previously received 1 dose of PCV13 or PPSV23   Hepatitis B Vaccine 3 dose series if at intermediate or high risk (ex: diabetes, end stage renal disease, liver disease)   Respiratory syncytial virus (RSV) Vaccine - COVERED BY MEDICARE PART D  * RSVPreF3 (Arexvy) CDC recommends that adults 60 years of age and older may receive a single dose of RSV vaccine using shared clinical decision-making (SCDM)   Tetanus (Td) Vaccine - COST NOT COVERED BY MEDICARE PART B Following completion of primary series, a booster dose should be given every 10 years to maintain immunity against tetanus. Td may also be given as tetanus wound prophylaxis.   Tdap Vaccine - COST NOT COVERED BY MEDICARE PART B Recommended at least once for all adults. For pregnant patients, recommended with each pregnancy.   Shingles Vaccine (Shingrix) - COST NOT COVERED BY MEDICARE PART B  2 shot series recommended in those 19 years and older who have or will have weakened immune systems or those 50 years and older     Health Maintenance Due:      Topic Date Due   • Breast Cancer Screening: Mammogram  Never done     Immunizations Due:      Topic Date Due   • Influenza Vaccine (1) 09/01/2023   • COVID-19 Vaccine (4 - 2023-24 season) 09/01/2023     Advance Directives   What are advance directives?  Advance directives are legal documents that state your wishes and plans for medical care. These plans are made ahead of time in case you lose your ability to make decisions for yourself. Advance directives can apply to any medical decision, such as the treatments you want,  and if you want to donate organs.   What are the types of advance directives?  There are many types of advance directives, and each state has rules about how to use them. You may choose a combination of any of the following:  Living will:  This is a written record of the treatment you want. You can also choose which treatments you do not want, which to limit, and which to stop at a certain time. This includes surgery, medicine, IV fluid, and tube feedings.   Durable power of  for healthcare (DPAHC):  This is a written record that states who you want to make healthcare choices for you when you are unable to make them for yourself. This person, called a proxy, is usually a family member or a friend. You may choose more than 1 proxy.  Do not resuscitate (DNR) order:  A DNR order is used in case your heart stops beating or you stop breathing. It is a request not to have certain forms of treatment, such as CPR. A DNR order may be included in other types of advance directives.  Medical directive:  This covers the care that you want if you are in a coma, near death, or unable to make decisions for yourself. You can list the treatments you want for each condition. Treatment may include pain medicine, surgery, blood transfusions, dialysis, IV or tube feedings, and a ventilator (breathing machine).  Values history:  This document has questions about your views, beliefs, and how you feel and think about life. This information can help others choose the care that you would choose.  Why are advance directives important?  An advance directive helps you control your care. Although spoken wishes may be used, it is better to have your wishes written down. Spoken wishes can be misunderstood, or not followed. Treatments may be given even if you do not want them. An advance directive may make it easier for your family to make difficult choices about your care.   Weight Management   Why it is important to manage your weight:   Being overweight increases your risk of health conditions such as heart disease, high blood pressure, type 2 diabetes, and certain types of cancer. It can also increase your risk for osteoarthritis, sleep apnea, and other respiratory problems. Aim for a slow, steady weight loss. Even a small amount of weight loss can lower your risk of health problems.  How to lose weight safely:  A safe and healthy way to lose weight is to eat fewer calories and get regular exercise. You can lose up about 1 pound a week by decreasing the number of calories you eat by 500 calories each day.   Healthy meal plan for weight management:  A healthy meal plan includes a variety of foods, contains fewer calories, and helps you stay healthy. A healthy meal plan includes the following:  Eat whole-grain foods more often.  A healthy meal plan should contain fiber. Fiber is the part of grains, fruits, and vegetables that is not broken down by your body. Whole-grain foods are healthy and provide extra fiber in your diet. Some examples of whole-grain foods are whole-wheat breads and pastas, oatmeal, brown rice, and bulgur.  Eat a variety of vegetables every day.  Include dark, leafy greens such as spinach, kale, waqas greens, and mustard greens. Eat yellow and orange vegetables such as carrots, sweet potatoes, and winter squash.   Eat a variety of fruits every day.  Choose fresh or canned fruit (canned in its own juice or light syrup) instead of juice. Fruit juice has very little or no fiber.  Eat low-fat dairy foods.  Drink fat-free (skim) milk or 1% milk. Eat fat-free yogurt and low-fat cottage cheese. Try low-fat cheeses such as mozzarella and other reduced-fat cheeses.  Choose meat and other protein foods that are low in fat.  Choose beans or other legumes such as split peas or lentils. Choose fish, skinless poultry (chicken or turkey), or lean cuts of red meat (beef or pork). Before you cook meat or poultry, cut off any visible fat.   Use  less fat and oil.  Try baking foods instead of frying them. Add less fat, such as margarine, sour cream, regular salad dressing and mayonnaise to foods. Eat fewer high-fat foods. Some examples of high-fat foods include french fries, doughnuts, ice cream, and cakes.  Eat fewer sweets.  Limit foods and drinks that are high in sugar. This includes candy, cookies, regular soda, and sweetened drinks.  Exercise:  Exercise at least 30 minutes per day on most days of the week. Some examples of exercise include walking, biking, dancing, and swimming. You can also fit in more physical activity by taking the stairs instead of the elevator or parking farther away from stores. Ask your healthcare provider about the best exercise plan for you.      © Copyright [employer] 2018 Information is for End User's use only and may not be sold, redistributed or otherwise used for commercial purposes. All illustrations and images included in CareNotes® are the copyrighted property of [employer] or [employer]

## 2024-02-12 NOTE — PROGRESS NOTES
Assessment and Plan:   Medicare wellness visit  Problem List Items Addressed This Visit     Benign essential hypertension - Primary    Relevant Orders    CBC and differential    TSH, 3rd generation with Free T4 reflex    Edema    Hyperlipidemia    Relevant Orders    Comprehensive metabolic panel    Lipid Panel with Direct LDL reflex    Arthritis    Exudative age-related macular degeneration of left eye, unspecified stage (HCC)    Class 1 obesity due to excess calories without serious comorbidity with body mass index (BMI) of 30.0 to 30.9 in adult    Contusion of knee        Preventive health issues were discussed with patient, and age appropriate screening tests were ordered as noted in patient's After Visit Summary.  Personalized health advice and appropriate referrals for health education or preventive services given if needed, as noted in patient's After Visit Summary.     History of Present Illness:     Patient presents for a Medicare Wellness Visit    HPI   Patient Care Team:  Mir Aggarwal DO as PCP - General (Family Medicine)  Mir Aggarwal DO as PCP - PCP-[facility]  DO Mir Ross MD     Review of Systems:     Review of Systems     Problem List:     Patient Active Problem List   Diagnosis   • Benign essential hypertension   • Edema   • Hyperlipidemia   • Arthritis   • Exudative age-related macular degeneration of left eye, unspecified stage (HCC)   • Class 1 obesity due to excess calories without serious comorbidity with body mass index (BMI) of 30.0 to 30.9 in adult   • Gait difficulty   • Injury of left knee   • Primary osteoarthritis of left knee   • Chronic pain of left knee   • Baker's cyst, left   • Contusion of knee      Past Medical and Surgical History:     Past Medical History:   Diagnosis Date   • Accelerated essential hypertension     last assessed: 4/27/15   • Cataract    • Fatigue     last assessed: 1/21/14   • Hyperlipidemia    • Hypertension    •  Obesity      Past Surgical History:   Procedure Laterality Date   • EYE SURGERY     • FOOT SURGERY     • KNEE SURGERY Right 2019    TKR      Family History:     Family History   Problem Relation Age of Onset   • Coronary artery disease Father    • Arthritis Father    • Diabetes Father    • Cancer Father    • Arthritis Other    • Diabetes Other       Social History:     Social History     Socioeconomic History   • Marital status: /Civil Union     Spouse name: None   • Number of children: 2   • Years of education: None   • Highest education level: None   Occupational History   • None   Tobacco Use   • Smoking status: Former     Current packs/day: 0.00     Types: Cigarettes     Quit date:      Years since quittin.1   • Smokeless tobacco: Never   Vaping Use   • Vaping status: Never Used   Substance and Sexual Activity   • Alcohol use: Yes     Comment: occasional   • Drug use: No   • Sexual activity: Not Currently     Partners: Male   Other Topics Concern   • None   Social History Narrative    Lives with spouse     Social Determinants of Health     Financial Resource Strain: Not on file   Food Insecurity: Not on file   Transportation Needs: Not on file   Physical Activity: Not on file   Stress: Not on file   Social Connections: Not on file   Intimate Partner Violence: Not on file   Housing Stability: Not on file      Medications and Allergies:     Current Outpatient Medications   Medication Sig Dispense Refill   • Cholecalciferol (VITAMIN D3) 5000 units CAPS Take 1 capsule by mouth daily       • ezetimibe-simvastatin (VYTORIN) 10-20 mg per tablet Take 1 tablet by mouth daily 90 tablet 1   • furosemide (LASIX) 20 mg tablet Take 1 tablet (20 mg total) by mouth daily (Patient taking differently: Take 20 mg by mouth daily TAKEN AS NEEDED) 90 tablet 1   • hydrochlorothiazide (MICROZIDE) 12.5 mg capsule Take 1 capsule (12.5 mg total) by mouth daily 90 capsule 1   • losartan (COZAAR) 50 mg tablet Take 1 tablet  (50 mg total) by mouth 2 (two) times a day 180 tablet 0   • metoprolol succinate (TOPROL-XL) 25 mg 24 hr tablet Take 1 tablet (25 mg total) by mouth daily 90 tablet 1   • Multiple Vitamins-Minerals (PRESERVISION AREDS 2+MULTI VIT PO) Take 1 capsule by mouth daily     • Multiple Vitamins-Minerals (PRESERVISION AREDS 2+MULTI VIT PO) Take by mouth       No current facility-administered medications for this visit.     Allergies   Allergen Reactions   • Sulfa Antibiotics Anaphylaxis      Immunizations:     Immunization History   Administered Date(s) Administered   • COVID-19 MODERNA VACC 0.5 ML IM 03/24/2021, 04/23/2021, 12/22/2021   • INFLUENZA 11/28/2017, 09/03/2020, 01/27/2022   • Influenza Split High Dose Preservative Free IM 11/28/2017   • Pneumococcal Conjugate 13-Valent 08/04/2015   • Pneumococcal Polysaccharide PPV23 07/19/2013   • Zoster Vaccine Recombinant 02/26/2020      Health Maintenance:         Topic Date Due   • Breast Cancer Screening: Mammogram  Never done         Topic Date Due   • Influenza Vaccine (1) 09/01/2023   • COVID-19 Vaccine (4 - 2023-24 season) 09/01/2023      Medicare Screening Tests and Risk Assessments:     Rancho is here for her Subsequent Wellness visit. Last Medicare Wellness visit information reviewed, patient interviewed and updates made to the record today.      Health Risk Assessment:   Patient rates overall health as very good. Patient feels that their physical health rating is same. Patient is very satisfied with their life. Eyesight was rated as same. Hearing was rated as same. Patient feels that their emotional and mental health rating is same. Patients states they are never, rarely angry. Patient states they are sometimes unusually tired/fatigued. Pain experienced in the last 7 days has been some. Patient's pain rating has been 3/10. Patient states that she has experienced no weight loss or gain in last 6 months.     Depression Screening:   PHQ-2 Score: 0      Fall Risk  Screening:   In the past year, patient has experienced: no history of falling in past year      Urinary Incontinence Screening:   Patient has leaked urine accidently in the last six months.     Home Safety:  Patient does not have trouble with stairs inside or outside of their home. Patient has working smoke alarms and has no working carbon monoxide detector. Home safety hazards include: none.     Nutrition:   Current diet is Regular.     Medications:   Patient is currently taking over-the-counter supplements. OTC medications include: see medication list. Patient is able to manage medications.     Activities of Daily Living (ADLs)/Instrumental Activities of Daily Living (IADLs):   Walk and transfer into and out of bed and chair?: Yes  Dress and groom yourself?: Yes    Bathe or shower yourself?: Yes    Feed yourself? Yes  Do your laundry/housekeeping?: Yes  Manage your money, pay your bills and track your expenses?: Yes  Make your own meals?: Yes    Do your own shopping?: Yes    Previous Hospitalizations:   Any hospitalizations or ED visits within the last 12 months?: No    How many hospitalizations have you had in the last year?: 1-2    Advance Care Planning:   Living will: Yes    Durable POA for healthcare: No    Advanced directive: Yes    Advanced directive counseling given: Yes    Five wishes given: No    Patient declined ACP directive: No      Comments: Advised pt to bring in documents for scanning    Cognitive Screening:   Provider or family/friend/caregiver concerned regarding cognition?: No    PREVENTIVE SCREENINGS      Cardiovascular Screening:    General: Screening Not Indicated and History Lipid Disorder      Diabetes Screening:     General: Risks and Benefits Discussed      Colorectal Cancer Screening:     General: Risks and Benefits Discussed      Breast Cancer Screening:     General: Risks and Benefits Discussed      Cervical Cancer Screening:    General: Screening Not Indicated      Osteoporosis  Screening:    General: Risks and Benefits Discussed      Abdominal Aortic Aneurysm (AAA) Screening:        General: Risks and Benefits Discussed      Lung Cancer Screening:     General: Screening Not Indicated      Hepatitis C Screening:    General: Risks and Benefits Discussed    Screening, Brief Intervention, and Referral to Treatment (SBIRT)    Screening      AUDIT-C Screenin) How often did you have a drink containing alcohol in the past year? never  2) How many drinks did you have on a typical day when you were drinking in the past year? 0  3) How often did you have 6 or more drinks on one occasion in the past year? never    AUDIT-C Score: 0  Interpretation: Score 0-2 (female): Negative screen for alcohol misuse    Single Item Drug Screening:  How often have you used an illegal drug (including marijuana) or a prescription medication for non-medical reasons in the past year? never    Single Item Drug Screen Score: 0  Interpretation: Negative screen for possible drug use disorder    No results found.     Physical Exam:     /84 (BP Location: Right arm, Patient Position: Sitting, Cuff Size: Standard)   Pulse 75   Temp (!) 97.3 °F (36.3 °C) (Temporal)   Resp 20   Wt 88 kg (194 lb)   SpO2 96%   BMI 33.30 kg/m²     Physical Exam     Mir Aggarwal DO

## 2024-02-12 NOTE — ASSESSMENT & PLAN NOTE
Compliant on Vytorin 10/20.  Patient is due for yearly labs.  Order placed.  Will call with results

## 2024-02-12 NOTE — PROGRESS NOTES
Name: Rancho Grier      : 1940      MRN: [id number]  Encounter Provider: Mir Aggarwal DO  Encounter Date: 2024   Encounter department: [facility]    Assessment & Plan     1. Benign essential hypertension  Assessment & Plan:  Blood pressure reasonably controlled on losartan 50 twice daily, metoprolol 25, and hydrochlorothiazide 12.5    Orders:  -     CBC and differential; Future  -     TSH, 3rd generation with Free T4 reflex; Future    2. Mixed hyperlipidemia  Assessment & Plan:  Compliant on Vytorin 10/20.  Patient is due for yearly labs.  Order placed.  Will call with results    Orders:  -     Comprehensive metabolic panel; Future  -     Lipid Panel with Direct LDL reflex; Future    3. Edema, unspecified type  Assessment & Plan:  Mild edema today.  Overall stable on furosemide 20 mg daily.      4. Arthritis  Assessment & Plan:  Status post right total knee replacement.  Patient having increased left knee pain, especially since recent fall 3 weeks ago.  Recent injections through orthopedics.      5. Class 1 obesity due to excess calories without serious comorbidity with body mass index (BMI) of 30.0 to 30.9 in adult  Assessment & Plan:  Weight 194, BMI 33.30.  Patient has been unable to exercise recently due to knee pain.  She is being followed by orthopedics.      6. Contusion of knee, unspecified laterality, initial encounter  Assessment & Plan:  Patient had a fall approximately 3 weeks ago contusing both knees.  Being followed by orthopedics      7. Exudative age-related macular degeneration of left eye, unspecified stage (HCC)         Patient presents with her  today for recheck chronic medical problems today.  She had a fall 3 weeks ago landing on her knees.  She is being followed by orthopedics.  Otherwise she is compliant with prescribed medications.  She tries to eat healthy, but has not been very active lately due to her knees.  Exam today essentially  unremarkable.  Recommend continue to work on healthy diet and increasing activity.    I had a long conversation today with her and her  regarding vaccinations.  They will consider COVID booster, flu shot, and RSV.  Patient current with pneumonia vaccination and Shingrix.    Due for yearly fasting blood work.  Ordered today.  Will call with results  Subjective     Patient presents with her  today for recheck chronic medical problems today.  She had a fall 3 weeks ago landing on her knees.  She is being followed by orthopedics.  Otherwise she is compliant with prescribed medications.  She tries to eat healthy, but has not been very active lately due to her knees.      Review of Systems   Respiratory: Negative.     Cardiovascular: Negative.    Gastrointestinal: Negative.    Genitourinary: Negative.        Past Medical History:   Diagnosis Date   • Accelerated essential hypertension     last assessed: 4/27/15   • Cataract    • Fatigue     last assessed: 14   • Hyperlipidemia    • Hypertension    • Obesity      Past Surgical History:   Procedure Laterality Date   • EYE SURGERY     • FOOT SURGERY     • KNEE SURGERY Right 2019    TKR     Family History   Problem Relation Age of Onset   • Coronary artery disease Father    • Arthritis Father    • Diabetes Father    • Cancer Father    • Arthritis Other    • Diabetes Other      Social History     Socioeconomic History   • Marital status: /Civil Union     Spouse name: None   • Number of children: 2   • Years of education: None   • Highest education level: None   Occupational History   • None   Tobacco Use   • Smoking status: Former     Current packs/day: 0.00     Types: Cigarettes     Quit date:      Years since quittin.   • Smokeless tobacco: Never   Vaping Use   • Vaping status: Never Used   Substance and Sexual Activity   • Alcohol use: Yes     Comment: occasional   • Drug use: No   • Sexual activity: Not Currently     Partners: Male    Other Topics Concern   • None   Social History Narrative    Lives with spouse     Social Determinants of Health     Financial Resource Strain: Not on file   Food Insecurity: Not on file   Transportation Needs: Not on file   Physical Activity: Not on file   Stress: Not on file   Social Connections: Not on file   Intimate Partner Violence: Not on file   Housing Stability: Not on file     Current Outpatient Medications on File Prior to Visit   Medication Sig   • Cholecalciferol (VITAMIN D3) 5000 units CAPS Take 1 capsule by mouth daily     • ezetimibe-simvastatin (VYTORIN) 10-20 mg per tablet Take 1 tablet by mouth daily   • furosemide (LASIX) 20 mg tablet Take 1 tablet (20 mg total) by mouth daily (Patient taking differently: Take 20 mg by mouth daily TAKEN AS NEEDED)   • hydrochlorothiazide (MICROZIDE) 12.5 mg capsule Take 1 capsule (12.5 mg total) by mouth daily   • losartan (COZAAR) 50 mg tablet Take 1 tablet (50 mg total) by mouth 2 (two) times a day   • metoprolol succinate (TOPROL-XL) 25 mg 24 hr tablet Take 1 tablet (25 mg total) by mouth daily   • Multiple Vitamins-Minerals (PRESERVISION AREDS 2+MULTI VIT PO) Take 1 capsule by mouth daily   • Multiple Vitamins-Minerals (PRESERVISION AREDS 2+MULTI VIT PO) Take by mouth     Allergies   Allergen Reactions   • Sulfa Antibiotics Anaphylaxis     Immunization History   Administered Date(s) Administered   • COVID-19 MODERNA VACC 0.5 ML IM 03/24/2021, 04/23/2021, 12/22/2021   • INFLUENZA 11/28/2017, 09/03/2020, 01/27/2022   • Influenza Split High Dose Preservative Free IM 11/28/2017   • Pneumococcal Conjugate 13-Valent 08/04/2015   • Pneumococcal Polysaccharide PPV23 07/19/2013   • Zoster Vaccine Recombinant 02/26/2020       Objective     /84 (BP Location: Right arm, Patient Position: Sitting, Cuff Size: Standard)   Pulse 75   Temp (!) 97.3 °F (36.3 °C) (Temporal)   Resp 20   Wt 88 kg (194 lb)   SpO2 96%   BMI 33.30 kg/m²     Physical  Exam  Cardiovascular:      Rate and Rhythm: Normal rate and regular rhythm.      Heart sounds: Normal heart sounds.      Comments: Carotids: no bruits  Ext: no edema  Pulmonary:      Effort: Pulmonary effort is normal. No respiratory distress.      Breath sounds: No wheezing or rales.   Psychiatric:         Behavior: Behavior normal.         Thought Content: Thought content normal.       Mir Aggarwal, DO

## 2024-03-25 DIAGNOSIS — I10 BENIGN ESSENTIAL HYPERTENSION: ICD-10-CM

## 2024-03-25 DIAGNOSIS — E78.5 HYPERLIPIDEMIA, UNSPECIFIED HYPERLIPIDEMIA TYPE: ICD-10-CM

## 2024-03-26 RX ORDER — EZETIMIBE AND SIMVASTATIN 10; 20 MG/1; MG/1
1 TABLET ORAL DAILY
Qty: 90 TABLET | Refills: 1 | Status: SHIPPED | OUTPATIENT
Start: 2024-03-26

## 2024-03-26 RX ORDER — HYDROCHLOROTHIAZIDE 12.5 MG/1
12.5 CAPSULE, GELATIN COATED ORAL DAILY
Qty: 90 CAPSULE | Refills: 1 | Status: SHIPPED | OUTPATIENT
Start: 2024-03-26

## 2024-03-26 RX ORDER — METOPROLOL SUCCINATE 25 MG/1
25 TABLET, EXTENDED RELEASE ORAL DAILY
Qty: 90 TABLET | Refills: 1 | Status: SHIPPED | OUTPATIENT
Start: 2024-03-26

## 2024-07-22 ENCOUNTER — TELEPHONE (OUTPATIENT)
Age: 84
End: 2024-07-22

## 2024-07-22 NOTE — TELEPHONE ENCOUNTER
Appointment refused with provider. 8/12/24    Reason: Patient states she wasn't aware of the appointment and she has other appointments she needs to attend.  Patient advised she will call back at another time to schedule.

## 2025-07-07 ENCOUNTER — TELEPHONE (OUTPATIENT)
Dept: FAMILY MEDICINE CLINIC | Facility: CLINIC | Age: 85
End: 2025-07-07